# Patient Record
Sex: FEMALE | Race: BLACK OR AFRICAN AMERICAN | NOT HISPANIC OR LATINO | Employment: FULL TIME | URBAN - METROPOLITAN AREA
[De-identification: names, ages, dates, MRNs, and addresses within clinical notes are randomized per-mention and may not be internally consistent; named-entity substitution may affect disease eponyms.]

---

## 2023-10-05 ENCOUNTER — DOCUMENTATION (OUTPATIENT)
Dept: ORTHOPEDIC SURGERY | Facility: CLINIC | Age: 43
End: 2023-10-05
Payer: COMMERCIAL

## 2023-10-05 ENCOUNTER — TELEPHONE (OUTPATIENT)
Dept: ORTHOPEDIC SURGERY | Facility: CLINIC | Age: 43
End: 2023-10-05
Payer: COMMERCIAL

## 2023-10-05 NOTE — TELEPHONE ENCOUNTER
"Pamela Finn sent an e-mail to me, Lillian Singh LPN from [email or web address]    The email is as follows    \"Good Evening    I know I was supposed to submit my paperwork to you and  back in July after my visit but I was able to work a while before the pain started getting bad. My left knee bothers me usually 3-4 days minimum a month. I have attached my FMLA papers for Intermittent leave and my job description again. 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    Thank You  Pamela Finn  [phone]\"        Paper work was forwarded to corresponding providers  for completion      Thank you,     Lillian Singh LPN                "

## 2023-10-10 ENCOUNTER — OFFICE VISIT (OUTPATIENT)
Dept: PRIMARY CARE | Facility: CLINIC | Age: 43
End: 2023-10-10
Payer: COMMERCIAL

## 2023-10-10 VITALS
BODY MASS INDEX: 48.26 KG/M2 | DIASTOLIC BLOOD PRESSURE: 86 MMHG | WEIGHT: 293 LBS | SYSTOLIC BLOOD PRESSURE: 128 MMHG | TEMPERATURE: 97.3 F | HEART RATE: 73 BPM

## 2023-10-10 DIAGNOSIS — R51.9 NONINTRACTABLE HEADACHE, UNSPECIFIED CHRONICITY PATTERN, UNSPECIFIED HEADACHE TYPE: Primary | ICD-10-CM

## 2023-10-10 DIAGNOSIS — E55.9 VITAMIN D DEFICIENCY: ICD-10-CM

## 2023-10-10 DIAGNOSIS — G47.30 SLEEP APNEA, UNSPECIFIED TYPE: ICD-10-CM

## 2023-10-10 DIAGNOSIS — Z00.00 ROUTINE GENERAL MEDICAL EXAMINATION AT A HEALTH CARE FACILITY: ICD-10-CM

## 2023-10-10 DIAGNOSIS — Z78.0 MENOPAUSE: ICD-10-CM

## 2023-10-10 DIAGNOSIS — R10.9 ABDOMINAL PAIN, UNSPECIFIED ABDOMINAL LOCATION: ICD-10-CM

## 2023-10-10 DIAGNOSIS — T78.40XS ALLERGY, SEQUELA: ICD-10-CM

## 2023-10-10 PROCEDURE — 1036F TOBACCO NON-USER: CPT | Performed by: FAMILY MEDICINE

## 2023-10-10 PROCEDURE — 3008F BODY MASS INDEX DOCD: CPT | Performed by: FAMILY MEDICINE

## 2023-10-10 PROCEDURE — 99396 PREV VISIT EST AGE 40-64: CPT | Performed by: FAMILY MEDICINE

## 2023-10-10 RX ORDER — FLUTICASONE PROPIONATE 50 MCG
SPRAY, SUSPENSION (ML) NASAL
COMMUNITY
Start: 2019-04-02 | End: 2023-10-24 | Stop reason: SDUPTHER

## 2023-10-10 RX ORDER — AZELASTINE HYDROCHLORIDE 0.5 MG/ML
SOLUTION/ DROPS OPHTHALMIC
COMMUNITY
Start: 2022-03-23

## 2023-10-10 RX ORDER — TOPIRAMATE 25 MG/1
50 TABLET ORAL 2 TIMES DAILY
Qty: 90 TABLET | Refills: 3 | Status: SHIPPED | OUTPATIENT
Start: 2023-10-10 | End: 2024-02-18

## 2023-10-10 RX ORDER — LIDOCAINE 50 MG/G
PATCH TOPICAL
COMMUNITY
Start: 2023-07-27 | End: 2024-01-24 | Stop reason: ALTCHOICE

## 2023-10-10 RX ORDER — MONTELUKAST SODIUM 10 MG/1
10 TABLET ORAL DAILY
COMMUNITY
End: 2023-10-10 | Stop reason: SDUPTHER

## 2023-10-10 RX ORDER — PANTOPRAZOLE SODIUM 40 MG/1
40 TABLET, DELAYED RELEASE ORAL 2 TIMES DAILY
Qty: 60 TABLET | Refills: 1 | Status: SHIPPED | OUTPATIENT
Start: 2023-10-10 | End: 2023-11-06

## 2023-10-10 RX ORDER — FLUTICASONE PROPIONATE 50 MCG
1 SPRAY, SUSPENSION (ML) NASAL DAILY
Qty: 16 G | Refills: 11 | Status: SHIPPED | OUTPATIENT
Start: 2023-10-10 | End: 2023-11-06

## 2023-10-10 RX ORDER — METHYLPREDNISOLONE 4 MG/1
TABLET ORAL
Qty: 21 TABLET | Refills: 0 | Status: SHIPPED | OUTPATIENT
Start: 2023-10-10 | End: 2023-10-17

## 2023-10-10 RX ORDER — BUPROPION HYDROCHLORIDE 300 MG/1
1 TABLET ORAL DAILY
COMMUNITY
Start: 2019-10-01 | End: 2023-10-10 | Stop reason: SDUPTHER

## 2023-10-10 RX ORDER — BUPROPION HYDROCHLORIDE 300 MG/1
300 TABLET ORAL DAILY
Qty: 90 TABLET | Refills: 3 | Status: SHIPPED | OUTPATIENT
Start: 2023-10-10

## 2023-10-10 RX ORDER — AZELASTINE 1 MG/ML
2 SPRAY, METERED NASAL 2 TIMES DAILY
COMMUNITY
Start: 2019-06-20 | End: 2024-01-24 | Stop reason: ALTCHOICE

## 2023-10-10 RX ORDER — TOPIRAMATE 25 MG/1
1 TABLET ORAL 2 TIMES DAILY
COMMUNITY
Start: 2023-05-12 | End: 2023-10-10 | Stop reason: SDUPTHER

## 2023-10-10 RX ORDER — MONTELUKAST SODIUM 10 MG/1
10 TABLET ORAL DAILY
Qty: 90 TABLET | Refills: 3 | Status: SHIPPED | OUTPATIENT
Start: 2023-10-10

## 2023-10-10 RX ORDER — AZELASTINE 1 MG/ML
1 SPRAY, METERED NASAL 2 TIMES DAILY
Qty: 30 ML | Refills: 12 | Status: SHIPPED | OUTPATIENT
Start: 2023-10-10 | End: 2024-01-24 | Stop reason: WASHOUT

## 2023-10-10 ASSESSMENT — ENCOUNTER SYMPTOMS: ABDOMINAL PAIN: 1

## 2023-10-10 NOTE — PROGRESS NOTES
Subjective   Patient ID: Pamela Finn is a 43 y.o. female who presents for Annual Exam.    HPI     Review of Systems   Gastrointestinal:  Positive for abdominal pain.   All other systems reviewed and are negative.      Objective   /86   Pulse 73   Temp 36.3 °C (97.3 °F)   Wt 136 kg (299 lb)   BMI 48.26 kg/m²     Physical Exam  Cardiovascular:      Rate and Rhythm: Normal rate.   Pulmonary:      Effort: Pulmonary effort is normal.   Abdominal:      Palpations: Abdomen is soft.   Musculoskeletal:      Cervical back: Normal range of motion.   Skin:     General: Skin is warm.   Neurological:      General: No focal deficit present.      Mental Status: She is alert.   Psychiatric:         Mood and Affect: Mood normal.         Assessment/Plan     This is a 43-year-old female who is here for her annual physical    Today's complaints she has a headache frontal headache on exam there were no neurological signs but I feel her headaches are due to allergies sinus pressure treated with Medrol Dosepak Azelastine Nasal spray Flonase    I am very concerned that she has gained 30 pounds over the last 1 year therefore I will refer her to neurology to rule out pseudotumor cerebri    I increase the Topamax to 50 twice a day    She says she has a sugar addiction the reason for the weight gain is that therefore [facility]    Also I referred her to the bariatric center    She is being followed by the breast surgeons for abnormal mammograms    She has lower abdominal pain and nausea for which I gave Protonix also referred her to the gastroenterologist    All the routine lab work was ordered    Advised her to come back in 6 months      She has had a hysterectomy and was asking for hormone replacement therapy I referred her to gynecology  She does not wear her CPAP machine therefore I referred her to the sleep specialist

## 2023-10-10 NOTE — PROGRESS NOTES
Subjective   Patient ID: Pamela Finn is a 43 y.o. female who presents for Annual Exam.    HPI     Review of Systems    Objective   /86   Pulse 73   Temp 36.3 °C (97.3 °F)   Wt 136 kg (299 lb)   BMI 48.26 kg/m²     Physical Exam    Assessment/Plan

## 2023-10-14 ENCOUNTER — LAB (OUTPATIENT)
Dept: LAB | Facility: LAB | Age: 43
End: 2023-10-14
Payer: COMMERCIAL

## 2023-10-14 DIAGNOSIS — E55.9 VITAMIN D DEFICIENCY: ICD-10-CM

## 2023-10-14 DIAGNOSIS — Z00.00 ROUTINE GENERAL MEDICAL EXAMINATION AT A HEALTH CARE FACILITY: ICD-10-CM

## 2023-10-14 LAB
25(OH)D3 SERPL-MCNC: 10 NG/ML (ref 30–100)
ALBUMIN SERPL BCP-MCNC: 3.9 G/DL (ref 3.4–5)
ALP SERPL-CCNC: 83 U/L (ref 33–110)
ALT SERPL W P-5'-P-CCNC: 11 U/L (ref 7–45)
ANION GAP SERPL CALC-SCNC: 13 MMOL/L (ref 10–20)
AST SERPL W P-5'-P-CCNC: 14 U/L (ref 9–39)
BASOPHILS # BLD AUTO: 0.04 X10*3/UL (ref 0–0.1)
BASOPHILS NFR BLD AUTO: 0.8 %
BILIRUB SERPL-MCNC: 0.5 MG/DL (ref 0–1.2)
BUN SERPL-MCNC: 11 MG/DL (ref 6–23)
CALCIUM SERPL-MCNC: 9.6 MG/DL (ref 8.6–10.6)
CHLORIDE SERPL-SCNC: 103 MMOL/L (ref 98–107)
CHOLEST SERPL-MCNC: 177 MG/DL (ref 0–199)
CHOLESTEROL/HDL RATIO: 3.2
CO2 SERPL-SCNC: 27 MMOL/L (ref 21–32)
CREAT SERPL-MCNC: 0.78 MG/DL (ref 0.5–1.05)
EOSINOPHIL # BLD AUTO: 0.09 X10*3/UL (ref 0–0.7)
EOSINOPHIL NFR BLD AUTO: 1.7 %
ERYTHROCYTE [DISTWIDTH] IN BLOOD BY AUTOMATED COUNT: 13.3 % (ref 11.5–14.5)
EST. AVERAGE GLUCOSE BLD GHB EST-MCNC: 108 MG/DL
GFR SERPL CREATININE-BSD FRML MDRD: >90 ML/MIN/1.73M*2
GLUCOSE SERPL-MCNC: 91 MG/DL (ref 74–99)
HBA1C MFR BLD: 5.4 %
HCT VFR BLD AUTO: 42.3 % (ref 36–46)
HDLC SERPL-MCNC: 55.3 MG/DL
HGB BLD-MCNC: 13.3 G/DL (ref 12–16)
IMM GRANULOCYTES # BLD AUTO: 0.01 X10*3/UL (ref 0–0.7)
IMM GRANULOCYTES NFR BLD AUTO: 0.2 % (ref 0–0.9)
LDLC SERPL CALC-MCNC: 112 MG/DL
LYMPHOCYTES # BLD AUTO: 1.94 X10*3/UL (ref 1.2–4.8)
LYMPHOCYTES NFR BLD AUTO: 37.3 %
MCH RBC QN AUTO: 27.8 PG (ref 26–34)
MCHC RBC AUTO-ENTMCNC: 31.4 G/DL (ref 32–36)
MCV RBC AUTO: 88 FL (ref 80–100)
MONOCYTES # BLD AUTO: 0.39 X10*3/UL (ref 0.1–1)
MONOCYTES NFR BLD AUTO: 7.5 %
NEUTROPHILS # BLD AUTO: 2.73 X10*3/UL (ref 1.2–7.7)
NEUTROPHILS NFR BLD AUTO: 52.5 %
NON HDL CHOLESTEROL: 122 MG/DL (ref 0–149)
NRBC BLD-RTO: 0 /100 WBCS (ref 0–0)
PLATELET # BLD AUTO: 423 X10*3/UL (ref 150–450)
PMV BLD AUTO: 10 FL (ref 7.5–11.5)
POTASSIUM SERPL-SCNC: 4.5 MMOL/L (ref 3.5–5.3)
PROT SERPL-MCNC: 7.3 G/DL (ref 6.4–8.2)
RBC # BLD AUTO: 4.79 X10*6/UL (ref 4–5.2)
SODIUM SERPL-SCNC: 138 MMOL/L (ref 136–145)
TRIGL SERPL-MCNC: 51 MG/DL (ref 0–149)
TSH SERPL-ACNC: 1.32 MIU/L (ref 0.44–3.98)
VLDL: 10 MG/DL (ref 0–40)
WBC # BLD AUTO: 5.2 X10*3/UL (ref 4.4–11.3)

## 2023-10-14 PROCEDURE — 85025 COMPLETE CBC W/AUTO DIFF WBC: CPT

## 2023-10-14 PROCEDURE — 84443 ASSAY THYROID STIM HORMONE: CPT

## 2023-10-14 PROCEDURE — 82306 VITAMIN D 25 HYDROXY: CPT

## 2023-10-14 PROCEDURE — 80053 COMPREHEN METABOLIC PANEL: CPT

## 2023-10-14 PROCEDURE — 83036 HEMOGLOBIN GLYCOSYLATED A1C: CPT

## 2023-10-14 PROCEDURE — 36415 COLL VENOUS BLD VENIPUNCTURE: CPT

## 2023-10-14 PROCEDURE — 80061 LIPID PANEL: CPT

## 2023-10-24 ENCOUNTER — DOCUMENTATION (OUTPATIENT)
Dept: GASTROENTEROLOGY | Facility: HOSPITAL | Age: 43
End: 2023-10-24
Payer: COMMERCIAL

## 2023-10-24 PROBLEM — R11.2 NAUSEA AND VOMITING: Status: ACTIVE | Noted: 2023-10-24

## 2023-10-24 PROBLEM — L98.9 SKIN LESION: Status: ACTIVE | Noted: 2023-10-24

## 2023-10-24 PROBLEM — H00.11 CHALAZION OF RIGHT UPPER EYELID: Status: ACTIVE | Noted: 2023-10-24

## 2023-10-24 PROBLEM — R29.818 SUSPECTED SLEEP APNEA: Status: ACTIVE | Noted: 2023-10-24

## 2023-10-24 PROBLEM — M17.12 ARTHRITIS OF KNEE, LEFT: Status: ACTIVE | Noted: 2023-10-24

## 2023-10-24 PROBLEM — E88.810 DYSMETABOLIC SYNDROME: Status: ACTIVE | Noted: 2023-10-24

## 2023-10-24 PROBLEM — I10 BENIGN ESSENTIAL HYPERTENSION: Status: ACTIVE | Noted: 2023-10-24

## 2023-10-24 PROBLEM — N63.0 LUMP, BREAST: Status: ACTIVE | Noted: 2023-10-24

## 2023-10-24 PROBLEM — J20.9 BRONCHITIS WITH BRONCHOSPASM: Status: ACTIVE | Noted: 2023-10-24

## 2023-10-24 PROBLEM — S83.282A TEAR OF LATERAL MENISCUS OF LEFT KNEE: Status: ACTIVE | Noted: 2023-10-24

## 2023-10-24 PROBLEM — R35.0 INCREASED FREQUENCY OF URINATION: Status: ACTIVE | Noted: 2023-10-24

## 2023-10-24 PROBLEM — J30.9 ALLERGIC RHINITIS DUE TO ALLERGEN: Status: ACTIVE | Noted: 2023-10-24

## 2023-10-24 PROBLEM — Z98.84 BARIATRIC SURGERY STATUS: Status: ACTIVE | Noted: 2023-10-24

## 2023-10-24 PROBLEM — R01.1 MURMUR, CARDIAC: Status: ACTIVE | Noted: 2023-10-24

## 2023-10-24 PROBLEM — J02.9 SORE THROAT: Status: ACTIVE | Noted: 2023-10-24

## 2023-10-24 PROBLEM — M25.561 KNEE PAIN, BILATERAL: Status: ACTIVE | Noted: 2023-10-24

## 2023-10-24 PROBLEM — L70.9 ACNE: Status: ACTIVE | Noted: 2023-10-24

## 2023-10-24 PROBLEM — R35.0 URINARY FREQUENCY: Status: ACTIVE | Noted: 2023-10-24

## 2023-10-24 PROBLEM — M25.569 KNEE PAIN: Status: ACTIVE | Noted: 2023-10-24

## 2023-10-24 PROBLEM — R51.9 HEAD ACHE: Status: ACTIVE | Noted: 2023-10-24

## 2023-10-24 PROBLEM — K21.9 GERD (GASTROESOPHAGEAL REFLUX DISEASE): Status: ACTIVE | Noted: 2023-10-24

## 2023-10-24 PROBLEM — R63.2 POLYPHAGIA: Status: ACTIVE | Noted: 2023-10-24

## 2023-10-24 PROBLEM — G47.00 INSOMNIA: Status: ACTIVE | Noted: 2023-10-24

## 2023-10-24 PROBLEM — B35.1 ONYCHOMYCOSIS OF TOENAIL: Status: ACTIVE | Noted: 2023-10-24

## 2023-10-24 PROBLEM — H52.4 PRESBYOPIA: Status: ACTIVE | Noted: 2023-10-24

## 2023-10-24 PROBLEM — H00.19 CHALAZION: Status: ACTIVE | Noted: 2023-10-24

## 2023-10-24 PROBLEM — H00.019 STYE EXTERNAL: Status: ACTIVE | Noted: 2023-10-24

## 2023-10-24 PROBLEM — N92.6 IRREGULAR MENSES: Status: ACTIVE | Noted: 2023-10-24

## 2023-10-24 PROBLEM — J30.9 ALLERGIC RHINITIS: Status: ACTIVE | Noted: 2023-10-24

## 2023-10-24 PROBLEM — N63.10 BREAST MASS, RIGHT: Status: ACTIVE | Noted: 2023-10-24

## 2023-10-24 PROBLEM — R51.9 CHRONIC HEADACHES: Status: ACTIVE | Noted: 2023-10-24

## 2023-10-24 PROBLEM — M25.562 KNEE PAIN, BILATERAL: Status: ACTIVE | Noted: 2023-10-24

## 2023-10-24 PROBLEM — G47.33 OBSTRUCTIVE SLEEP APNEA SYNDROME: Status: ACTIVE | Noted: 2023-10-24

## 2023-10-24 PROBLEM — B34.9 VIRAL INFECTION: Status: ACTIVE | Noted: 2023-10-24

## 2023-10-24 PROBLEM — N93.9 ABNORMAL VAGINAL BLEEDING: Status: ACTIVE | Noted: 2023-10-24

## 2023-10-24 PROBLEM — H52.209 ASTIGMATISM: Status: ACTIVE | Noted: 2023-10-24

## 2023-10-24 PROBLEM — J30.2 SEASONAL ALLERGIC RHINITIS: Status: ACTIVE | Noted: 2023-10-24

## 2023-10-24 PROBLEM — N63.20 BREAST MASS, LEFT: Status: ACTIVE | Noted: 2023-10-24

## 2023-10-24 PROBLEM — N94.6 SEVERE MENSTRUAL CRAMPS: Status: ACTIVE | Noted: 2023-10-24

## 2023-10-24 PROBLEM — M17.0 LOCALIZED OSTEOARTHRITIS OF KNEES, BILATERAL: Status: ACTIVE | Noted: 2023-10-24

## 2023-10-24 PROBLEM — R92.8 ABNORMAL SCREENING MAMMOGRAM: Status: ACTIVE | Noted: 2023-10-24

## 2023-10-24 PROBLEM — H52.00 HYPEROPIA: Status: ACTIVE | Noted: 2023-10-24

## 2023-10-24 PROBLEM — R68.81 EARLY SATIETY: Status: ACTIVE | Noted: 2023-10-24

## 2023-10-24 PROBLEM — E66.01 MORBID OBESITY WITH BODY MASS INDEX (BMI) OF 40.0 TO 44.9 IN ADULT (MULTI): Status: ACTIVE | Noted: 2023-10-24

## 2023-10-24 PROBLEM — G89.29 CHRONIC HEADACHES: Status: ACTIVE | Noted: 2023-10-24

## 2023-10-24 PROBLEM — M94.20 CHONDROMALACIA: Status: ACTIVE | Noted: 2023-10-24

## 2023-10-24 PROBLEM — R06.02 SHORTNESS OF BREATH: Status: ACTIVE | Noted: 2023-10-24

## 2023-10-24 PROBLEM — E66.01 MORBID (SEVERE) OBESITY DUE TO EXCESS CALORIES (MULTI): Status: ACTIVE | Noted: 2023-10-24

## 2023-10-24 RX ORDER — PHENTERMINE HYDROCHLORIDE 37.5 MG/1
TABLET ORAL
COMMUNITY
End: 2024-01-24 | Stop reason: ALTCHOICE

## 2023-10-24 RX ORDER — MOMETASONE FUROATE 1 MG/G
CREAM TOPICAL
COMMUNITY
End: 2024-01-24 | Stop reason: ALTCHOICE

## 2023-10-24 RX ORDER — RIZATRIPTAN BENZOATE 10 MG/1
TABLET ORAL
COMMUNITY

## 2023-10-24 RX ORDER — MELOXICAM 15 MG/1
TABLET ORAL
COMMUNITY
End: 2024-01-24 | Stop reason: ALTCHOICE

## 2023-10-26 ENCOUNTER — OFFICE VISIT (OUTPATIENT)
Dept: GASTROENTEROLOGY | Facility: HOSPITAL | Age: 43
End: 2023-10-26
Payer: COMMERCIAL

## 2023-10-26 ENCOUNTER — APPOINTMENT (OUTPATIENT)
Dept: ORTHOPEDIC SURGERY | Facility: CLINIC | Age: 43
End: 2023-10-26
Payer: COMMERCIAL

## 2023-10-26 VITALS
HEART RATE: 77 BPM | DIASTOLIC BLOOD PRESSURE: 73 MMHG | SYSTOLIC BLOOD PRESSURE: 118 MMHG | BODY MASS INDEX: 46.65 KG/M2 | OXYGEN SATURATION: 97 % | WEIGHT: 289 LBS | TEMPERATURE: 97 F

## 2023-10-26 DIAGNOSIS — K21.9 GASTROESOPHAGEAL REFLUX DISEASE WITHOUT ESOPHAGITIS: Primary | ICD-10-CM

## 2023-10-26 DIAGNOSIS — R10.9 ABDOMINAL PAIN, UNSPECIFIED ABDOMINAL LOCATION: ICD-10-CM

## 2023-10-26 PROCEDURE — 99215 OFFICE O/P EST HI 40 MIN: CPT | Performed by: INTERNAL MEDICINE

## 2023-10-26 PROCEDURE — 3008F BODY MASS INDEX DOCD: CPT | Performed by: INTERNAL MEDICINE

## 2023-10-26 PROCEDURE — 1036F TOBACCO NON-USER: CPT | Performed by: INTERNAL MEDICINE

## 2023-10-26 PROCEDURE — 3074F SYST BP LT 130 MM HG: CPT | Performed by: INTERNAL MEDICINE

## 2023-10-26 PROCEDURE — 99205 OFFICE O/P NEW HI 60 MIN: CPT | Performed by: INTERNAL MEDICINE

## 2023-10-26 PROCEDURE — 3078F DIAST BP <80 MM HG: CPT | Performed by: INTERNAL MEDICINE

## 2023-10-26 SDOH — ECONOMIC STABILITY: FOOD INSECURITY: WITHIN THE PAST 12 MONTHS, YOU WORRIED THAT YOUR FOOD WOULD RUN OUT BEFORE YOU GOT MONEY TO BUY MORE.: NEVER TRUE

## 2023-10-26 SDOH — ECONOMIC STABILITY: FOOD INSECURITY: WITHIN THE PAST 12 MONTHS, THE FOOD YOU BOUGHT JUST DIDN'T LAST AND YOU DIDN'T HAVE MONEY TO GET MORE.: NEVER TRUE

## 2023-10-26 ASSESSMENT — PAIN SCALES - GENERAL: PAINLEVEL: 0-NO PAIN

## 2023-10-26 ASSESSMENT — PATIENT HEALTH QUESTIONNAIRE - PHQ9
1. LITTLE INTEREST OR PLEASURE IN DOING THINGS: NOT AT ALL
2. FEELING DOWN, DEPRESSED OR HOPELESS: NOT AT ALL
SUM OF ALL RESPONSES TO PHQ9 QUESTIONS 1 & 2: 0

## 2023-10-26 ASSESSMENT — LIFESTYLE VARIABLES: HOW OFTEN DO YOU HAVE A DRINK CONTAINING ALCOHOL: NEVER

## 2023-10-26 NOTE — PROGRESS NOTES
Gastroenterology Office Visit Note    Reason For Visit:  Heart burn    History Of Present Illness  Pamela Finn is a 43 y.o. female with a past medical history of migraine and allergies came in  for evaluation of heartburn. This has been associated with abdominal bloating, belching, fullness after meals, and heartburn. She denies choking on food, difficulty swallowing, hoarseness, and laryngitis. Symptoms have been present for 1 months. She denies dysphagia. She has not lost weight. She denies melena, hematochezia, hematemesis, and coffee ground emesis. Medical therapy in the past has included: proton pump inhibitors, recently started nexium 40 mg twice daily about a week ago. Patient takes meloxicam but hasn't had taken in months.     Patient has FH of stomach cancer in his dad but didn't report any FH of colorectal, pancreatic or esophageal cancer. Also, no FH of inflammatory bowel disease.    Prev endoscopic eval:   EGD 2021  Impression:            - Normal esophagus.                         - Z-line regular, 38 cm from the incisors.                         - Normal stomach. Biopsied.                         - Normal examined duodenum.     Past Medical History  As menitoned in the HPI.    Surgical History  Cholecystectomy     Social History  She reports that she has never smoked. She has never used smokeless tobacco. She reports that she does not currently use alcohol. She reports that she does not use drugs.    Family History  Family History   Problem Relation Name Age of Onset    Hypertension Mother      Alcohol abuse Father      Diabetes Father      Hypertension Father      Glaucoma Maternal Grandfather      Breast cancer Other Grandparent         Allergies  Amoxicillin, Oxycodone, Oxycodone-acetaminophen, Penicillin g, and Penicillin    Home Medications  Current Outpatient Medications   Medication Instructions    azelastine (Astelin) 137 mcg (0.1 %) nasal spray 2 sprays, nasal, 2 times daily    azelastine  (Astelin) 137 mcg (0.1 %) nasal spray 1 spray, Each Nostril, 2 times daily, Use in each nostril as directed    azelastine (Optivar) 0.05 % ophthalmic solution ophthalmic (eye)    buPROPion XL (WELLBUTRIN XL) 300 mg, oral, Daily    fluticasone (Flonase) 50 mcg/actuation nasal spray 1 spray, Each Nostril, Daily, Shake gently. Before first use, prime pump. After use, clean tip and replace cap.    lidocaine (Lidoderm) 5 % patch APPLY 1 PATCH TO THE AFFECTED AREA AND LEAVE IN PLACE FOR 12 HOURS, THEN REMOVE AND LEAVE OFF FOR 12 HOURS.    meloxicam (Mobic) 15 mg tablet TAKE 1 TABLET DAILY WITH FOOD.<BR>    mometasone (Elocon) 0.1 % cream No dose, route, or frequency recorded.    montelukast (SINGULAIR) 10 mg, oral, Daily    pantoprazole (PROTONIX) 40 mg, oral, 2 times daily, Do not crush, chew, or split.    phentermine (Adipex-P) 37.5 mg tablet Take 1 tablet daily    rizatriptan (Maxalt) 10 mg tablet 1po under the tongue on onset of severe headache may repeat it again after 8 hours up to 2 a day    topiramate (TOPAMAX) 50 mg, oral, 2 times daily        Review of Systems  His/Her 12 point ROS is negative except as above.      Physical Exam  Objective   Visit Vitals  /73   Pulse 77   Temp 36.1 °C (97 °F)          General: not in acute distress  HEENT: atraumatic, normocephalic  CV: regular rate and rhythm, no murmur  Resp: Normal breathing pattern  GI: soft, non-tender to palpation, no rebound or guarding, no ascites  Msk: no lower extremity edema  Derm: no jaundice  Psych: normal affect   Neuro: AAOX3, moving all limbs       Relevant Results  Lab Results   Component Value Date    WBC 5.2 10/14/2023    HGB 13.3 10/14/2023    HCT 42.3 10/14/2023    MCV 88 10/14/2023     10/14/2023     Lab Results   Component Value Date    GLUCOSE 91 10/14/2023    CALCIUM 9.6 10/14/2023     10/14/2023    K 4.5 10/14/2023    CO2 27 10/14/2023     10/14/2023    BUN 11 10/14/2023    CREATININE 0.78 10/14/2023     Lab  Results   Component Value Date    ALT 11 10/14/2023    AST 14 10/14/2023    ALKPHOS 83 10/14/2023    BILITOT 0.5 10/14/2023            ASSESSMENT/PLAN:  #Heartburn  #Epigastric pain  #Nausea    Patient reported t typical symptoms suggestive of GERD however she does not have any alarming features including weight loss, melena, anemia or dysphagia.  She has had normal endoscopy in 2021.  She is not currently taking any PPI.  Her symptoms of nausea could be related to esophagitis or gastritis.    Plan:  - We will give her a trial of PPI twice daily for 4 to 6 weeks and if her symptoms does not improve, we will repeat endoscopy and further work-up including pH impedance study.  - Education provided for GERD including weight loss, elevation of head side of her bed, avoiding spicy and greasy foods.  - Follow-up in 3 months.    I spent more than 35 minutes in the professional and overall care of this patient.    Madonna Cuellar MD

## 2023-10-26 NOTE — PATIENT INSTRUCTIONS
Thank you for visiting us today, As we discussed today,     1: Recommend taking pantoprazole 40 mg twice daily, best taken 30 minutes before breakfast and dinner.   2: If you don't feel any improvement in 6-8 weeks, we will consider doing additional work up including endoscopy. Please notify us in case if your symptoms don't improve with pantoprazole.  3: Follow up in 3 months.    Please call us for any questions on [phone], and if you need to schedule an appointment please call us at [phone].

## 2023-10-29 NOTE — PROGRESS NOTES
I saw and evaluated the patient. I personally obtained the key and critical portions of the history and physical exam or was physically present for key and critical portions performed by the trainee. I agree with the trainee's medical decision making as described in the trainee's note.    Aron Duran MD

## 2023-11-03 PROBLEM — E66.01 MORBID OBESITY WITH BODY MASS INDEX (BMI) OF 40.0 TO 44.9 IN ADULT (MULTI): Status: RESOLVED | Noted: 2023-10-24 | Resolved: 2023-11-03

## 2023-11-03 NOTE — PROGRESS NOTES
"       [facility]  [address]  [facility]  [address]  [place] [address]     [facility]  New Visit Note      Subjective   Patient ID: Pamela Finn is a 43 y.o. female with past medical history significant for morbid obesity, HTN, heart murmur, allergic rhinitis, bariatric status, and sleep disorder breathing    Patient is here {pxarrival:99358} and referred by Nikki Mclaughlin MD  for comprehensive sleep medicine evaluation due to {SleepCC:38535}.    HPI  {OSAhx:76325}      SLEEP STUDY HISTORY: (personally reviewed raw data such as interpretation report, data sheet, hypnogram, and titration table if available and applicable)  ***    [unfilled]    SLEEP-WAKE SCHEDULE  Bedtime: *** on weekdays, *** on weekends  Subjective sleep latency: ***  Problems falling asleep: ***  Number of awakenings: *** times per night spontaneously for unknown reasons  Falls back asleep in ***  Problems staying asleep: ***  Final wake time: *** on weekdays, *** on weekends  Out of bed time: *** on weekdays, *** on weekends  Shift work: ***  Naps: ***  Feels rested after a nap: {Yes/No:93296}  Average sleep duration (excluding naps): ***    SLEEP ENVIRONMENT  Sleep location: ***  Sleep status: {sleep status:44072}  TV in bedroom: {Yes/No:68718}  Room is dark:  {Yes/No:00080::\"Yes\"}  Room is quiet: {Yes/No:72706::\"Yes\"}  Room is cool: {Yes/No:13822::\"Yes\"}  Bed comfort: good    SLEEP HABITS:   Activities before bedtime: ***  Activities in bed: ***  Preferred sleep position: {Sleep position:34659}  Clockwatching: {Yes/No:56700}  Uses alarm to wake up: {Yes/No:70588}    SLEEP ROS:  Night symptoms: {sleep apnea ROS at night:06736}  Morning symptoms: {sleep apnea ROS in mornin}  Daytime symptoms {sleep apnea ROS at daytime:46771}  Hypersomnia / narcolepsy symptoms: {narcolepsy ROS:79346}  RLS symptoms: {RLS " symptoms:75409}  Movements in sleep: {sleep movements:42902}  Parasomnia symptoms: {parasomnia ROS:59161}    WEIGHT: {weight:30536}    REVIEW OF SYSTEMS: All other systems have been reviewed and are negative.    PERTINENT SOCIAL HISTORY:  Occupation: employment status:58536}  Smoking: {Yes/No:43893}  ETOH: {Yes/No:17882}  Marijuana: {Yes/No:10582}  Caffeine: ***  Sleep aids: {Yes/No:73933}  Claustrophobia: {Yes/No:12790}    PERTINENT PAST SURGICAL HISTORY:  {surgical history pertinent in sleep:09082}    PERTINENT FAMILY HISTORY:  {family history in sleep:65143}    Active Problems, Allergy List, Medication List, and PMH/PSH/FH/Social Hx have been reviewed and reconciled in chart. No significant changes unless documented in the pertinent chart section. Updates made when necessary.       Objective   There were no vitals taken for this visit.    No data recorded  [unfilled]    Physical Exam  Constitutional:alert and oriented to time, place, and person  Sinus: *** tenderness to palpation  Palate: Normal / Narrow / High-arched / *** torus palatini  Mallampati ***, Tonsils: ***  *** Tongue scalloping, *** macroglossia  Lungs: Clear to auscultation bilateral, no rales  Heart: Regular rate and rhythm, no murmurs    Assessment/Plan   Pamela Finn is a 43 y.o. female who is seen to evaluate for ***possible obstructive sleep apnea. The pathophysiology of sleep apnea, diagnostic testing (HST vs PSG), treatment options (PAP, oral appliance, surgery, hypoglossal nerve stimulator called Inspire), and supportive management (weight loss, positional therapy, smoking cessation, avoidance of alcohol and sedatives) were discussed with the patient in detail. Risk factors of sleep apnea as well as cardiometabolic and neurocognitive sequelae associated with untreated sleep apnea were also discussed. Lastly, patient was advised to avoid driving vehicle or operating heavy machinery when sleepy.     Pamela Finn with the following problems:      SLEEP DISORDERED BREATHING:  -This is likely sleep apnea based on the the history and physical examination. STOP-BANG:  Pamela Álvarez not yet had a sleep study.  -Instruct patient to complete home/ in lab/ split night/ titration sleep study.  -HSAT is reasonable as patient likely has LUIS ANTONIO based on history and exam and does not have any of the following comorbidities: CHF, neuromuscular weakness, hypoventilation, or significant COPD.  -We consider treatment as indicated when testing is complete.        CHRONIC SLEEP ONSET/ SLEEP MAINTENANCE INSOMNIA:  -likely due to poor sleep hygiene, irregular sleep schedule, depression, anxiety, untreated sleep apnea, nocturia, RLS, delayed sleep phase syndrome, and chronic pain. [Meds] may be a contributing factor as well.  -RENEE:      HYPERTENSION/HEART MURMUR:  -Blood pressure was controlled today.  -Denies headache, palpitation, and syncope in the clinic.  -Last Echo:  -Follows with PCP/ Cardiology     MORBID OBESITY:  -with a BMI of []. Pamela Finn most recent Bicarbonate was 27 in Oct,2023.    NASAL CONGESTION:  -Instruct Pamela Teixeira use appropriate Flonase spray to ease congestion.    RTC 2-3 weeks after sleep study    {Assess/PlanSmartLinks:44969}    All of patient's questions were answered. She verbalizes understanding and agreement with my assessment and plan.

## 2023-11-04 DIAGNOSIS — R10.9 ABDOMINAL PAIN, UNSPECIFIED ABDOMINAL LOCATION: ICD-10-CM

## 2023-11-04 DIAGNOSIS — R51.9 NONINTRACTABLE HEADACHE, UNSPECIFIED CHRONICITY PATTERN, UNSPECIFIED HEADACHE TYPE: ICD-10-CM

## 2023-11-06 RX ORDER — PANTOPRAZOLE SODIUM 40 MG/1
40 TABLET, DELAYED RELEASE ORAL
Qty: 90 TABLET | Refills: 3 | Status: SHIPPED | OUTPATIENT
Start: 2023-11-06 | End: 2024-03-25

## 2023-11-06 RX ORDER — FLUTICASONE PROPIONATE 50 MCG
1 SPRAY, SUSPENSION (ML) NASAL DAILY
Qty: 48 ML | Refills: 4 | Status: SHIPPED | OUTPATIENT
Start: 2023-11-06 | End: 2024-11-05

## 2023-11-07 ENCOUNTER — DOCUMENTATION (OUTPATIENT)
Dept: SURGERY | Facility: CLINIC | Age: 43
End: 2023-11-07
Payer: COMMERCIAL

## 2023-11-07 ENCOUNTER — OFFICE VISIT (OUTPATIENT)
Dept: ORTHOPEDIC SURGERY | Facility: HOSPITAL | Age: 43
End: 2023-11-07
Payer: COMMERCIAL

## 2023-11-07 ENCOUNTER — APPOINTMENT (OUTPATIENT)
Dept: SLEEP MEDICINE | Facility: CLINIC | Age: 43
End: 2023-11-07
Payer: COMMERCIAL

## 2023-11-07 DIAGNOSIS — M17.0 LOCALIZED OSTEOARTHRITIS OF KNEES, BILATERAL: Primary | ICD-10-CM

## 2023-11-07 PROCEDURE — 3074F SYST BP LT 130 MM HG: CPT | Performed by: PHYSICIAN ASSISTANT

## 2023-11-07 PROCEDURE — 2500000005 HC RX 250 GENERAL PHARMACY W/O HCPCS: Performed by: PHYSICIAN ASSISTANT

## 2023-11-07 PROCEDURE — 99214 OFFICE O/P EST MOD 30 MIN: CPT | Performed by: PHYSICIAN ASSISTANT

## 2023-11-07 PROCEDURE — 99214 OFFICE O/P EST MOD 30 MIN: CPT | Mod: 25 | Performed by: PHYSICIAN ASSISTANT

## 2023-11-07 PROCEDURE — 2500000004 HC RX 250 GENERAL PHARMACY W/ HCPCS (ALT 636 FOR OP/ED): Performed by: PHYSICIAN ASSISTANT

## 2023-11-07 PROCEDURE — 3078F DIAST BP <80 MM HG: CPT | Performed by: PHYSICIAN ASSISTANT

## 2023-11-07 PROCEDURE — 20610 DRAIN/INJ JOINT/BURSA W/O US: CPT | Performed by: PHYSICIAN ASSISTANT

## 2023-11-07 PROCEDURE — 1036F TOBACCO NON-USER: CPT | Performed by: PHYSICIAN ASSISTANT

## 2023-11-07 PROCEDURE — 3008F BODY MASS INDEX DOCD: CPT | Performed by: PHYSICIAN ASSISTANT

## 2023-11-07 RX ORDER — TRIAMCINOLONE ACETONIDE 40 MG/ML
40 INJECTION, SUSPENSION INTRA-ARTICULAR; INTRAMUSCULAR
Status: COMPLETED | OUTPATIENT
Start: 2023-11-07 | End: 2023-11-07

## 2023-11-07 RX ORDER — LIDOCAINE HYDROCHLORIDE 10 MG/ML
5 INJECTION INFILTRATION; PERINEURAL
Status: COMPLETED | OUTPATIENT
Start: 2023-11-07 | End: 2023-11-07

## 2023-11-07 RX ADMIN — LIDOCAINE HYDROCHLORIDE 5 ML: 10 INJECTION, SOLUTION INFILTRATION; PERINEURAL at 15:02

## 2023-11-07 RX ADMIN — TRIAMCINOLONE ACETONIDE 40 MG: 40 INJECTION, SUSPENSION INTRA-ARTICULAR; INTRAMUSCULAR at 15:02

## 2023-11-07 NOTE — PROGRESS NOTES
HPI  Patient is a 43-year-old female with a history of localized osteoarthritis of the knees, bilaterally. She presents today for follow-up for left knee osteoarthritis and for repeat corticosteroid injection in her left knee only. Patient states that previous injections have provided her with good relief of symptoms for the full 3 months, she reports her left knee pain just returned last week. Conservative treatment options for osteoarthritis have been discussed such as physical therapy, weight loss, and NSAID's. Risks, including infection and cartilage damage, as well as benefits were explained regarding corticosteroid injections and the patient agreed to proceed with the injection today.     ROS: All other systems have been reviewed and are negative except as previously noted in history of present illness     Physical Exam  Gen: The patient is alert and oriented Ã--3, is in no acute distress, and appear their stated age and weight.  Psychiatric: Mood and affect are appropriate.  Eyes: Sclera are white, and pupils are round and symmetric.  ENT: Mucous membranes are moist.   Neck: Supple. Thyroid is midline.  Respiratory: Respirations are nonlabored, chest rise is symmetric.  Cardiac: Rate is regular by palpation of distal pulses.   Abdomen: Nondistended.  Integument: No obvious cutaneous lesions are noted. No signs of lymphangitis. No signs of systemic edema.    Left Knee Exam     Muscle Strength   The patient has normal left knee strength.    Tenderness   Left knee tenderness location: Lateral and patellofemoral compartments.    Range of Motion   The patient has normal left knee ROM.  Extension:  0   Flexion:  120     Tests   Left knee patellar apprehension test: positive patellar crepitus.    Other   Erythema: absent  Scars: absent  Sensation: normal  Pulse: present  Swelling: none  Effusion: no effusion present           XR/MR: Previous images of left knee reviewed personally by me today and reveal mild to  moderate arthritis with joint space narrowing noted in lateral compartment    Assessment  Patient is a 43-year-old female with a history of localized osteoarthritis of the knees, bilaterally. Previous x-rays of her knees showed mild degenerative changes. She is having continued pain with her left knee and has opted for a corticosteroid injection for symptom relief at this time.     Patient ID: Pamela Finn is a 43 y.o. female.    L Inj/Asp: L knee on 11/7/2023 3:02 PM  Indications: pain  Details: 21 G needle, anterolateral approach  Medications: 5 mL lidocaine 10 mg/mL (1 %); 40 mg triamcinolone acetonide 40 mg/mL  Outcome: tolerated well, no immediate complications  Procedure, treatment alternatives, risks and benefits explained, specific risks discussed. Consent was given by the patient. Immediately prior to procedure a time out was called to verify the correct patient, procedure, equipment, support staff and site/side marked as required. Patient was prepped and draped in the usual sterile fashion.         Plan  - Rest for 24-28 hours on the left knee and then gradually return to activities as normal  - Follow-up in 3 months or as needed  - No x-rays next visit  -All questions were answered today

## 2023-11-07 NOTE — PROGRESS NOTES
Pt called and left a vcml in the NAOMI box to be called for scheduling. I called the pt and scheduled and her np with her appt confirmation was emailed to verified email address.

## 2023-11-24 ENCOUNTER — APPOINTMENT (OUTPATIENT)
Dept: OBSTETRICS AND GYNECOLOGY | Facility: CLINIC | Age: 43
End: 2023-11-24
Payer: COMMERCIAL

## 2023-11-24 ENCOUNTER — APPOINTMENT (OUTPATIENT)
Dept: NUTRITION | Facility: CLINIC | Age: 43
End: 2023-11-24
Payer: COMMERCIAL

## 2024-01-02 ENCOUNTER — ANCILLARY PROCEDURE (OUTPATIENT)
Dept: RADIOLOGY | Facility: CLINIC | Age: 44
End: 2024-01-02
Payer: COMMERCIAL

## 2024-01-02 DIAGNOSIS — R92.8 OTHER ABNORMAL AND INCONCLUSIVE FINDINGS ON DIAGNOSTIC IMAGING OF BREAST: ICD-10-CM

## 2024-01-02 PROCEDURE — 77066 DX MAMMO INCL CAD BI: CPT | Performed by: STUDENT IN AN ORGANIZED HEALTH CARE EDUCATION/TRAINING PROGRAM

## 2024-01-02 PROCEDURE — 77062 BREAST TOMOSYNTHESIS BI: CPT

## 2024-01-02 PROCEDURE — 77062 BREAST TOMOSYNTHESIS BI: CPT | Performed by: STUDENT IN AN ORGANIZED HEALTH CARE EDUCATION/TRAINING PROGRAM

## 2024-01-22 NOTE — PROGRESS NOTES
Subjective   Date: 1/22/2024 Time: 9:06 AM  Name: Pamela Finn  MRN: [id number]  This is a 43 y.o. female with morbid obesity (Body mass index is 48.42 kg/m².) who presents to clinic for consideration of bariatric surgery. she has attempted and failed multiple diet and exercise regimens for weight loss. Initial Onset of obesity was in childhood.  Their goal for surgery is to  be healthier . The patient has tried multiple diets to lose weight including  meal replacement shakes . The patient was most successful with the  meal replacement shakes . The most pounds lost on this diet were 75 lbs. The patient considers their dietary weakness to be  irregular eating patterns, doesn't eat until late in the day, then eats high fat foods like ice cream, also drinks coke to help with migraines.  The patient reports a  highest weight ever of 299 pounds and lowest weight ever of 130 pounds Distribution of Obesity: is general. Current diet:  none . Compliance: N/A Problems:  irregular eating patterns  } Dietary Details Include:Dietary Details: 1-2 servings fruits and vegetables, 1 serving protein.  The patient does not exercise     Types of Exercise : does not exercise    Comorbidities: sleep apnea not using an appliance , arthritis left knee    Tried different things to lose weight and only successful for a little while.  She has researched surgery for a while.     Unsure which surgery she wants  Notes sweets to be her down fall.  No heartburn  She is on pantoprazole-she would get nausea after eating with some foods. Could not determine what food and then started PPI and nausea has gone away      On PPI    How bad is the heartburn? 0 = No symptoms  Heartburn when lying down? 0 = No symptoms  Heartburn when standing up? 0 = No symptoms  Heartburn after meals? 0 = No symptoms  Does heartburn change your diet? 0 = No symptoms  Does heartburn wake you from sleep? 0 = No symptoms  Do you have difficulty swallowing? 0 = No  symptoms  Do you have pain with swallowing? 0 = No symptoms  If you take medication, does this affect your daily life? 0 = No symptoms  How bad is the regurgitation? 0 = No symptoms  Regurgitation when lying down? 0 = No symptoms  Regurgitation when standing up? 0 = No symptoms  Regurgitation after meals? 0 = No symptoms  Does regurgitation change your diet? 0 = No symptoms  Does regurgitation wake you from sleep? 0 = No symptoms  How satisfied are you with your present condition? Satisfied    PMH:   Past Medical History:   Diagnosis Date    Allergies     seasonal    Arthritis     left knee    Migraines     Sleep apnea     has CPAP    Stomach disorder     nausea after eating, started 2023    Does not use her cpap/ not sure if has trouble sleeping but goes to bathroom a lot    PSH:   Past Surgical History:   Procedure Laterality Date    BREAST BIOPSY Right     benign (fibroadenoma)    CHOLECYSTECTOMY  2004    laparascopic    KNEE Left 2020    meniscus repair    OTHER SURGICAL HISTORY  04/02/2019    Laparascopic Hysterectomy    OTHER SURGICAL HISTORY Right 2009    sweat gland removal          FAMILY HISTORY:  Family History   Problem Relation Name Age of Onset    Hypertension Mother      Alcohol abuse Father      Diabetes Father      Hypertension Father      Glaucoma Maternal Grandfather      Breast cancer Other Grandparent         SOCIAL HISTORY:  Social History     Tobacco Use    Smoking status: Never    Smokeless tobacco: Never   Substance Use Topics    Alcohol use: Not Currently     Comment: no alcohol in 3 yrs.    Drug use: Never       MEDICATIONS:  Prior to Admission Medications:  Medication Documentation Review Audit       Reviewed by Hien Santoro MD MPH (Physician) on 01/24/24 at 1300      Medication Order Taking? Sig Documenting Provider Last Dose Status   Discontinued 01/24/24 1240   azelastine (Astelin) 137 mcg (0.1 %) nasal spray [id number] No Administer 1 spray into each nostril 2 times a day. Use in  each nostril as directed   Patient not taking: Reported on 10/26/2023    Nikki Mclaughlin MD Not Taking Active   azelastine (Optivar) 0.05 % ophthalmic solution [id number] No Administer into affected eye(s). Historical Provider, MD Not Taking Active   buPROPion XL (Wellbutrin XL) 300 mg 24 hr tablet [id number] No Take 1 tablet (300 mg) by mouth once daily. Nikki Mclaughlin MD Taking Active   fluticasone (Flonase) 50 mcg/actuation nasal spray [id number]  ADMINISTER 1 SPRAY INTO EACH NOSTRIL ONCE DAILY. SHAKE GENTLY. BEFORE FIRST USE, PRIME PUMP. AFTER USE, CLEAN TIP AND REPLACE CAP. Nikki Mclaughlin MD  Active   Discontinued 24 1240   Discontinued 24 1240    Discontinued 24 1241   montelukast (Singulair) 10 mg tablet [id number] No Take 1 tablet (10 mg) by mouth once daily. Nikki Mclaughlin MD Taking Active   pantoprazole (ProtoNix) 40 mg EC tablet [id number]  Take 1 tablet (40 mg) by mouth once daily in the morning. Take before meals. Do not crush, chew, or split. Nikki Mclaughlin MD   24 2359   Discontinued 24 1241   rizatriptan (Maxalt) 10 mg tablet [id number] No 1po under the tongue on onset of severe headache may repeat it again after 8 hours up to 2 a day Historical Provider, MD Not Taking Active   topiramate (Topamax) 25 mg tablet [id number] No Take 2 tablets (50 mg) by mouth 2 times a day. Nikki Mclaughlin MD Taking Active                Had meloxicam for knee, getting cortisone injections for the knee pain     ALLERGIES:  Allergies   Allergen Reactions    Amoxicillin Swelling     Hives and face swelling    Oxycodone Swelling     itching and all over swelling    Oxycodone-Acetaminophen Other    Penicillin G Unknown    Penicillin Rash       REVIEW OF SYSTEMS:  GENERAL: Negative for malaise, significant weight loss and fever  HEAD: Negative for headache, swelling.  NECK: Negative for lumps, goiter, pain and significant neck  swelling  RESPIRATORY: Negative for cough, wheezing or shortness of breath.  CARDIOVASCULAR: Negative for chest pain, leg swelling or palpitations.  GI: Negative for abdominal discomfort, blood in stools or black stools or change in bowel habits  : No history of dysuria, frequency or incontinence  MUSCULOSKELETAL: Negative for joint pain or swelling, back pain or muscle pain.  SKIN: Negative for lesions, rash, and itching.  PSYCH: Negative for sleep disturbance, mood disorder and recent psychosocial stressors.  ENDOCRINE: Negative for cold or heat intolerance, polyuria, polydipsia and goiter.    Objective   PHYSICAL EXAM:  Visit Vitals  OB Status Hysterectomy   Smoking Status Never     General appearance: obese, NAD  Neuro: AOx3  Head: EOMI; no swelling or lesions of scalp or face  ENT:  no lumps or lymphadenopathy, thyroid normal to palpation; oropharynx clear, no swelling or erythema, she has braces  Skin: warm, no erythema or rashes  Lungs: clear to percussion and auscultation  Heart: regular rhythm and S1, S2 normal  Abdomen: soft, non-tender, no masses, no organomegaly  Extremities: Normal exam of the extremities. No swelling or pain.  Psych: no hurried speech, no flight of ideas, normal affect    IMPRESSION:  Pamela Finn is a 43 y.o. female with a bmi of Body mass index is 48.42 kg/m². with the following diagnoses and co-morbidities:  LUIS ANTONIO, knee pain and all can be helped with weight loss.  She notes sweets to be her weakness.  We discussed the bypass and the sleeve at length.  I reassured her with the data with the weight loss and hair loss and complications.  We discussed nsaid use and both procedures.  We also discussed her propensity for sweets may make the bypass more attractive.      We discussed the bypass  at [facility] and all questions were answered. risks and benefits were discussed  The patient understands the risks and benefits of the procedure and how the procedure is performed. The patient  understands the risks include but are not limited to bleeding, infection, DVT, PE, pneumonia, myocardial infarction, leak along the staple lines, and weight regain. We discussed lifestyle changes necessary to be successful.        This patient does meet the criteria for a surgical weight loss procedure according to NIH guidelines.  The risks of sleeve gastrectomy, Pita-en-Y gastric bypass, and duodenal switch surgery including bleeding, leak, wound infection, dehydration, ulcers, internal hernia, DVT/PE, prolonged nausea/vomiting, incomplete resolution of associated medical conditions, reflux, weight regain, vitamin/mineral deficiencies, and death have been explained to the patient and Pamela Finn has expressed understanding and acceptance of them.     The increased risk of substance and alcohol abuse following bariatric surgery was discussed with the patient, along with the negative consequences of substance/alcohol use after surgery including addiction, worsening of mental health disorders, and injury to the stomach. The risk of smoking and vaping (tobacco or any other substance) after bariatric surgery was explained to the patient. This includes risk of anastamotic ulcers, gastritis, bleeding, perforation, stricture, and PO intolerance.  The patient expressed understanding and acceptance of these risks.      The benefits of the above surgeries including weight loss, improvement/resolution of associated medical and mental health conditions, improved mobility, and decreased mortality have been explained the the patient and Pamela Finn has expressed understanding and acceptance of them. Her complication risk is under 1%.     Assessment/Plan   PLAN:  The plan of treatment for Pamela Finn is to continue with the consultations and tests ordered today in hopes of qualifying for pre-operative clearance for bariatric surgery. This includes:      Make some lifestyle changes  No desserts except one  time/week  Consult Nutrition for education and MSWL  Consult Psychology  Consult cardiology  Consult pulmonology  Labs ordered  EGD  PCP for medical optimization  She is scheduled with sleep medicine and is prescribed CPAP therapy.   Please start using your cpap.     The following are some lifestyle changes you should begin to prepare you for your bypass surgery.   Eliminate soda and other carbonated beverages from your diet. Carbonation will not be well tolerated after surgery. Try Propel, Vitamin Water Zero, Sobe Lifewater, Crystal Light or water.    Increase fluid consumption to 64 oz daily. Do not drink within 30 minutes of eating as this will liquefy your food and make you hungry more quickly.    Exercise for 30-60 minutes daily. Brisk walking, bike riding and swimming are all examples of healthy exercise. If you are unable to exercise we recommend seated exercise.    Do not skip meals.    Take a multivitamin daily.    Lose weight. In preparation for your surgery it is important that you begin making healthier food choices now. Our dietitian will meet with you to help you select foods lower in calories and higher in nutrition. We would like you to lose at least 10  lbs prior to surgery.     Increase your protein intake to 60 grams per day.    Alcohol is empty calories. Please eliminate while preparing for surgery.    Plan your meals.      General Instruction: 1) Use the information we gave you today to work through your insurance requirements and medical clearances.   2) These documents need to get faxed to the program navigators so they can submit them for approval from your insurance company.   3) Obtain labs today at a  facility. We will call you with any abnormalities and corrections you need to make.   4) Continue to work with your primary care doctor and other specialist so your other health problems are well controlled prior to your surgery.   5) Adopt the recommendations of the program dietician so  you develop healthy eating patterns.   6) Work with the sleep team to get your sleep apnea treated to prevent other health problems .   7) Consider attending a support group to learn from other who have been through the process.   8) Come to the MSWL sessions.

## 2024-01-23 ENCOUNTER — APPOINTMENT (OUTPATIENT)
Dept: NEUROLOGY | Facility: CLINIC | Age: 44
End: 2024-01-23
Payer: COMMERCIAL

## 2024-01-24 ENCOUNTER — NUTRITION (OUTPATIENT)
Dept: SURGERY | Facility: CLINIC | Age: 44
End: 2024-01-24
Payer: COMMERCIAL

## 2024-01-24 ENCOUNTER — OFFICE VISIT (OUTPATIENT)
Dept: SURGERY | Facility: CLINIC | Age: 44
End: 2024-01-24
Payer: COMMERCIAL

## 2024-01-24 VITALS
WEIGHT: 291 LBS | HEART RATE: 82 BPM | HEIGHT: 65 IN | SYSTOLIC BLOOD PRESSURE: 127 MMHG | BODY MASS INDEX: 48.48 KG/M2 | DIASTOLIC BLOOD PRESSURE: 85 MMHG

## 2024-01-24 DIAGNOSIS — I10 BENIGN ESSENTIAL HYPERTENSION: ICD-10-CM

## 2024-01-24 DIAGNOSIS — G47.33 OBSTRUCTIVE SLEEP APNEA SYNDROME: ICD-10-CM

## 2024-01-24 DIAGNOSIS — E66.01 MORBID OBESITY (MULTI): ICD-10-CM

## 2024-01-24 DIAGNOSIS — Z98.84 BARIATRIC SURGERY STATUS: ICD-10-CM

## 2024-01-24 DIAGNOSIS — M25.562 PAIN IN BOTH KNEES, UNSPECIFIED CHRONICITY: ICD-10-CM

## 2024-01-24 DIAGNOSIS — M25.561 PAIN IN BOTH KNEES, UNSPECIFIED CHRONICITY: ICD-10-CM

## 2024-01-24 DIAGNOSIS — E66.01 MORBID (SEVERE) OBESITY DUE TO EXCESS CALORIES (MULTI): ICD-10-CM

## 2024-01-24 DIAGNOSIS — K21.9 GASTROESOPHAGEAL REFLUX DISEASE, UNSPECIFIED WHETHER ESOPHAGITIS PRESENT: ICD-10-CM

## 2024-01-24 DIAGNOSIS — E88.810 DYSMETABOLIC SYNDROME: ICD-10-CM

## 2024-01-24 PROCEDURE — 3008F BODY MASS INDEX DOCD: CPT | Performed by: SURGERY

## 2024-01-24 PROCEDURE — 3074F SYST BP LT 130 MM HG: CPT | Performed by: SURGERY

## 2024-01-24 PROCEDURE — 99215 OFFICE O/P EST HI 40 MIN: CPT | Performed by: SURGERY

## 2024-01-24 PROCEDURE — 3079F DIAST BP 80-89 MM HG: CPT | Performed by: SURGERY

## 2024-01-24 PROCEDURE — 1036F TOBACCO NON-USER: CPT | Performed by: SURGERY

## 2024-01-24 NOTE — PATIENT INSTRUCTIONS
Make some lifestyle changes  No desserts except one time/week  Consult Nutrition for education and MSWL  Consult Psychology  Consult cardiology  Consult pulmonology  Labs ordered  EGD  PCP for medical optimization  She is scheduled with sleep medicine and is prescribed CPAP therapy.   Please start using your cpap.     The following are some lifestyle changes you should begin to prepare you for your bypass surgery.   Eliminate soda and other carbonated beverages from your diet. Carbonation will not be well tolerated after surgery. Try Propel, Vitamin Water Zero, Sobe Lifewater, Crystal Light or water.    Increase fluid consumption to 64 oz daily. Do not drink within 30 minutes of eating as this will liquefy your food and make you hungry more quickly.    Exercise for 30-60 minutes daily. Brisk walking, bike riding and swimming are all examples of healthy exercise. If you are unable to exercise we recommend seated exercise.    Do not skip meals.    Take a multivitamin daily.    Lose weight. In preparation for your surgery it is important that you begin making healthier food choices now. Our dietitian will meet with you to help you select foods lower in calories and higher in nutrition. We would like you to lose at least 10  lbs prior to surgery.     Increase your protein intake to 60 grams per day.    Alcohol is empty calories. Please eliminate while preparing for surgery.    Plan your meals.      General Instruction: 1) Use the information we gave you today to work through your insurance requirements and medical clearances.   2) These documents need to get faxed to the program navigators so they can submit them for approval from your insurance company.   3) Obtain labs today at a  facility. We will call you with any abnormalities and corrections you need to make.   4) Continue to work with your primary care doctor and other specialist so your other health problems are well controlled prior to your surgery.   5)  Adopt the recommendations of the program dietician so you develop healthy eating patterns.   6) Work with the sleep team to get your sleep apnea treated to prevent other health problems .   7) Consider attending a support group to learn from other who have been through the process.   8) Come to the MSWL sessions.

## 2024-01-24 NOTE — PROGRESS NOTES
Initial Bariatric Nutrition Assessment    Surgeon:   Yvan   Patient is considering: Undecided     ASSESSMENT:  Current weight:         Initial start weight:   #289 ( from Oct 2023)- seeing Dr. ARANDA today in-person will obtain a wt there         Food allergies/intolerances:   No   Chewing/Swallowing/Dentition: No   Nausea / Vomiting / Hx Gastroparesis:  Nausea- on a medication- takes 2 times a day   Diarrhea/ Constipation: No   Smoking/Tobacco use: No   Vitamins/Minerals supplements: none   Hours of sleep/night: 5-6 hrs   Diet History: weight watchers, protein shake (premier shake)   Activity Level:low     24 HOUR RECALL/DIET HISTORY:  Breakfast:  skips or protein shake or cereal   Snack:  None  Lunch: skips    Snack: Bufflo chicken dip and snack size chip or none   Dinner: 1/2 steakum sandwich or salmon patties, rice and corn or subway   Snack: ice cream or none   Beverages: regular coke on occasion, regular gingeral more often, water,  2% milk on occasion, energy drink    Alcohol: No- stopped drinking 2 years ago     Person responsible for cooking & shopping?   Self and    How often do you eat sweet snacks?   2 times a week on average   How often do you eat savory snacks?  On occasion   How often do you eat out?   Rare   Do you feel overly stuffed?   No   Binge Eating?  No   Night Eating?  None   Emotional Eating?  None        READINESS TO LEARN:  Motivation to learn: Interested        Understanding of instruction: Good       Anticipated Compliance: Good     Family Support: U/A           Educational Materials Provided:    Pre-op Diet and sample menus                                             Nutrition Guidelines for Gastric Bypass and Sleeve Gastrectomy   Schedules for MSWL class and support group     Nutrition assessment completed today. Patient is seeking undecided- has an appt with Dr. Santoro this afternoon to decided surgery plan. Movement is low- has knee pain. Talked in detail today about need to  move her body consistently. Main fluid is water.  Does drink red bull when working long shift. Patient works security . More of a sweet eater than savory. Most of meals are made at home. Meals are inconsistent- usually eating 1-2 meals and snacks on occasion. Has used protein shakes in the past. Patient was asking several questions today and engaged in appt. Patient is aware of below goals and need for multiple F/Us pre-op to improve eating habits. Pleasant patient.     Patient was receptive to nutritional recommendations, asked numerous questions, and verbalized understanding of the weight loss surgery diet.  Patient expressed understanding about the importance of strict dietary compliance post-surgery to avoid nutritional deficiencies and achieve optimal weight loss and verbalized intent to follow dietary recommendations.    Malnutrition Screening:   Significant unintentional weight loss? n/a   Eating less than 75% of usual intake for more than 2 weeks? n/a      Nutrition Diagnosis:   Overweight/obesity related to excess energy intake as evidenced by BMI >= 40 kg/m^2.  Food- and nutrition-related knowledge deficit related to lack of prior exposure to surgical weight loss information as evidenced by pt new to surgical program.    Nutrition Interventions:   Modify type and amount of food and nutrients within meals and snacks.  Comprehensive Nutrition Education    Recommendations:  1. Structure meal patterns, eating three meals and 1-2 snacks per day. No skipping meals- Ok to use a protein shake for a meal replacement or need to meal prep. Do not go more than 4-5 hrs with out eating something   2.    Drink 64oz of calorie-free, caffeine-free, and non-carbonated beverages.-water, NO pop, No red bull unless you reach your fluid goal (they need to be sugar free)- decrease them overall.     4.    Stop drinking 30 minutes before meals, nothing with meals and wait 30 minutes after meals to drink again. Make meals last 30  minutes-chew thoroughly.   5.    Increase physical activity by 10-15 minutes as tolerated to an end goal of 60 minutes 5 x per week. Consistency is the key. Need to start sit down exercises, water aerobics or utube walking videos        Pre-op Goal weight: lose 5% of body weight    Nutrition Monitoring and Evaluation: 1-2 pound weight loss per week  Criteria: weight check  Need for Follow-up: 4-6 weeks (protein sources/ food options)     Patient does meet [employer] guidelines for weight loss surgery, however needs to demonstrate consistent effort in making dietary changes before giving clearance. It is anticipated that the patient will need at least 3-4 nutritional follow-up visits prior to clearance for surgery.        Cathy Hernandez RDN, LD

## 2024-01-25 ENCOUNTER — DOCUMENTATION (OUTPATIENT)
Dept: SURGERY | Facility: HOSPITAL | Age: 44
End: 2024-01-25
Payer: COMMERCIAL

## 2024-01-25 DIAGNOSIS — E66.01 MORBID (SEVERE) OBESITY DUE TO EXCESS CALORIES (MULTI): ICD-10-CM

## 2024-01-25 DIAGNOSIS — Z98.84 BARIATRIC SURGERY STATUS: ICD-10-CM

## 2024-01-25 NOTE — PROGRESS NOTES
Received pt's new patient initial nutri assessment forms and contracts, uploaded. Work list updated

## 2024-02-14 DIAGNOSIS — R51.9 NONINTRACTABLE HEADACHE, UNSPECIFIED CHRONICITY PATTERN, UNSPECIFIED HEADACHE TYPE: ICD-10-CM

## 2024-02-15 ENCOUNTER — OFFICE VISIT (OUTPATIENT)
Dept: ORTHOPEDIC SURGERY | Facility: CLINIC | Age: 44
End: 2024-02-15
Payer: COMMERCIAL

## 2024-02-15 ENCOUNTER — HOSPITAL ENCOUNTER (OUTPATIENT)
Dept: RADIOLOGY | Facility: CLINIC | Age: 44
Discharge: HOME | End: 2024-02-15
Payer: COMMERCIAL

## 2024-02-15 VITALS — BODY MASS INDEX: 48.48 KG/M2 | WEIGHT: 291 LBS | HEIGHT: 65 IN

## 2024-02-15 DIAGNOSIS — M17.0 LOCALIZED OSTEOARTHRITIS OF KNEES, BILATERAL: Primary | ICD-10-CM

## 2024-02-15 DIAGNOSIS — M17.0 LOCALIZED OSTEOARTHRITIS OF KNEES, BILATERAL: ICD-10-CM

## 2024-02-15 PROCEDURE — 1036F TOBACCO NON-USER: CPT | Performed by: PHYSICIAN ASSISTANT

## 2024-02-15 PROCEDURE — 99214 OFFICE O/P EST MOD 30 MIN: CPT | Performed by: PHYSICIAN ASSISTANT

## 2024-02-15 PROCEDURE — 73560 X-RAY EXAM OF KNEE 1 OR 2: CPT | Mod: BILATERAL PROCEDURE | Performed by: RADIOLOGY

## 2024-02-15 PROCEDURE — 2500000004 HC RX 250 GENERAL PHARMACY W/ HCPCS (ALT 636 FOR OP/ED): Performed by: PHYSICIAN ASSISTANT

## 2024-02-15 PROCEDURE — 20610 DRAIN/INJ JOINT/BURSA W/O US: CPT | Performed by: PHYSICIAN ASSISTANT

## 2024-02-15 PROCEDURE — 73560 X-RAY EXAM OF KNEE 1 OR 2: CPT | Mod: 50

## 2024-02-15 PROCEDURE — 3008F BODY MASS INDEX DOCD: CPT | Performed by: PHYSICIAN ASSISTANT

## 2024-02-15 PROCEDURE — 2500000005 HC RX 250 GENERAL PHARMACY W/O HCPCS: Performed by: PHYSICIAN ASSISTANT

## 2024-02-15 RX ORDER — LIDOCAINE HYDROCHLORIDE 10 MG/ML
5 INJECTION INFILTRATION; PERINEURAL
Status: COMPLETED | OUTPATIENT
Start: 2024-02-15 | End: 2024-02-15

## 2024-02-15 RX ORDER — TRIAMCINOLONE ACETONIDE 40 MG/ML
1 INJECTION, SUSPENSION INTRA-ARTICULAR; INTRAMUSCULAR
Status: COMPLETED | OUTPATIENT
Start: 2024-02-15 | End: 2024-02-15

## 2024-02-15 RX ADMIN — TRIAMCINOLONE ACETONIDE 1 ML: 40 INJECTION, SUSPENSION INTRA-ARTICULAR; INTRAMUSCULAR at 14:43

## 2024-02-15 RX ADMIN — LIDOCAINE HYDROCHLORIDE 5 ML: 10 INJECTION, SOLUTION INFILTRATION; PERINEURAL at 14:43

## 2024-02-15 ASSESSMENT — PAIN SCALES - GENERAL: PAINLEVEL_OUTOF10: 5 - MODERATE PAIN

## 2024-02-15 ASSESSMENT — PAIN - FUNCTIONAL ASSESSMENT: PAIN_FUNCTIONAL_ASSESSMENT: 0-10

## 2024-02-15 ASSESSMENT — PAIN DESCRIPTION - DESCRIPTORS: DESCRIPTORS: ACHING

## 2024-02-15 NOTE — LETTER
February 15, 2024     Patient: Pamela Finn   YOB: 1980   Date of Visit: 2/15/2024       To Whom It May Concern:    It is my medical opinion that Pamela Finn {Work release (duty restriction):52099}.    If you have any questions or concerns, please don't hesitate to call.         Sincerely,        Vanna Vazquez PA-C    CC: No Recipients

## 2024-02-15 NOTE — PROGRESS NOTES
HPI  Patient is a 43-year-old female with a history of localized osteoarthritis of the knees, bilaterally. She presents today for follow-up for left knee osteoarthritis and for repeat corticosteroid injection in her left knee only. Patient states that previous injections have provided her with good relief although she has noticed increased swelling in the left knee over the past few days.  She denies recent injury or trauma to the left knee, denies fever/chills, N/T or calf pain.    Patient is a 43 y.o. female with medical history significant for   Patient Active Problem List   Diagnosis    Abnormal screening mammogram    Abnormal vaginal bleeding    Acne    Skin lesion    Allergic rhinitis    Allergic rhinitis due to allergen    Arthritis of knee, left    Astigmatism    Hyperopia    Bariatric surgery status    Benign essential hypertension    Breast mass, left    Breast mass, right    Lump, breast    Bronchitis with bronchospasm    Chalazion    Chalazion of right upper eyelid    Chondromalacia    Chronic headaches    Dysmetabolic syndrome    Early satiety    Murmur, cardiac    GERD (gastroesophageal reflux disease)    Head ache    Increased frequency of urination    Urinary frequency    Insomnia    Irregular menses    Knee pain, bilateral    Knee pain    Localized osteoarthritis of knees, bilateral    Nausea and vomiting    Obstructive sleep apnea syndrome    Onychomycosis of toenail    Polyphagia    Presbyopia    Severe menstrual cramps    Shortness of breath    Sore throat    Stye external    Suspected sleep apnea    Tear of lateral meniscus of left knee    Viral infection    Morbid (severe) obesity due to excess calories (CMS/HCC)    Seasonal allergic rhinitis     ROS: All other systems have been reviewed and are negative except as previously noted in history of present illness.    Gen: The patient is alert and oriented ×3, is in no acute distress, and appear their stated age and weight.  Psychiatric: Mood and  affect are appropriate.  Eyes: Sclera are white, and pupils are round and symmetric.  ENT: Mucous membranes are moist.   Neck: Supple. Thyroid is midline.  Respiratory: Respirations are nonlabored, chest rise is symmetric.  Cardiac: Rate is regular by palpation of distal pulses.   Abdomen: Nondistended.  Integument: No obvious cutaneous lesions are noted. No signs of lymphangitis. No signs of systemic edema.    Musculoskeletal: LLE  skin intact, no wounds or breakdown noted  mild lower leg edema  TTP joint line medial and patellofemoral   no knee effusion noted  compartments soft  no calf tenderness  sensation intact to light touch  motor intact including TA/GS/EHL  palpable DP/PT pulses 2+  stable to valgus/varus stress exams at 30 degrees of flexion  negative Lachmans and posterior drawer  Positive patellar crepitus  knee motion: 0-120 degrees     XR: Prior images of left knee reviewed personally by me today and reveal mild arthritis with joint space narrowing noted in medial compartment, osteophyte formation    IMP:  Problem List Items Addressed This Visit       Localized osteoarthritis of knees, bilateral    Relevant Orders    XR knee 1-2 views bilateral         DISCUSSION: I discussed the conservative treatment options for osteoarthritis including physical therapy, NSAIDs and corticosteroid injection and briefly discussed indications for surgery. Patient should try to delay joint replacement surgery for as long as possible. If the patient's joint problem affects their quality of life and cause significant restriction of their activities, they may want to consider joint replacement surgery. I reviewed the importance for adopting a low impact lifestyle and avoidance of joint overloading activities. I explained the risks and benefits of the injection.  Patient verbalized understanding and wishes to proceed with cortisone injection for the left knee again today.     Patient ID: Pamela Finn is a 43 y.o.  female.    L Inj/Asp: L knee on 2/15/2024 2:43 PM  Indications: pain  Details: 21 G needle, anterolateral approach  Medications: 5 mL lidocaine 10 mg/mL (1 %); 1 mL triamcinolone acetonide 40 mg/mL  Outcome: tolerated well, no immediate complications  Consent was given by the patient. Immediately prior to procedure a time out was called to verify the correct patient, procedure, equipment, support staff and site/side marked as required. Patient was prepped and draped in the usual sterile fashion.           PLAN:  Patient should observe for signs of infection and/or adverse reactions (redness, significant increase in pain, fever, nausea or vomiting) after receiving an injection today. If you develop any of the above symptoms, please contact my office. Patient should see the maximum effect in 3 to 5 days and can receive another cortisone injection no sooner than 3 months from today. Patient will continue with activities as tolerated. Mobilize to prevent stiffness. May take Tylenol or ibuprofen as needed for the pain/swelling.  Follow up in 3 months, no x-rays needed. All questions were answered.

## 2024-02-15 NOTE — LETTER
February 15, 2024     Patient: Pamela Finn   YOB: 1980   Date of Visit: 2/15/2024       To Whom it May Concern:    Pamela Finn was seen in my clinic on 2/15/2024.    If you have any questions or concerns, please don't hesitate to call.         Sincerely,          Vanna Vazquez PA-C        CC: No Recipients

## 2024-02-18 RX ORDER — TOPIRAMATE 25 MG/1
50 TABLET ORAL 2 TIMES DAILY
Qty: 90 TABLET | Refills: 3 | Status: SHIPPED | OUTPATIENT
Start: 2024-02-18 | End: 2024-03-17

## 2024-02-29 ENCOUNTER — APPOINTMENT (OUTPATIENT)
Dept: SURGERY | Facility: CLINIC | Age: 44
End: 2024-02-29
Payer: COMMERCIAL

## 2024-03-14 DIAGNOSIS — R51.9 NONINTRACTABLE HEADACHE, UNSPECIFIED CHRONICITY PATTERN, UNSPECIFIED HEADACHE TYPE: ICD-10-CM

## 2024-03-17 RX ORDER — TOPIRAMATE 25 MG/1
50 TABLET ORAL 2 TIMES DAILY
Qty: 60 TABLET | Refills: 0 | Status: SHIPPED | OUTPATIENT
Start: 2024-03-17 | End: 2024-03-25
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
## 2024-03-25 ENCOUNTER — OFFICE VISIT (OUTPATIENT)
Dept: OBSTETRICS AND GYNECOLOGY | Facility: CLINIC | Age: 44
End: 2024-03-25
Payer: COMMERCIAL

## 2024-03-25 ENCOUNTER — LAB (OUTPATIENT)
Dept: LAB | Facility: LAB | Age: 44
End: 2024-03-25
Payer: COMMERCIAL

## 2024-03-25 VITALS
WEIGHT: 293 LBS | SYSTOLIC BLOOD PRESSURE: 127 MMHG | HEIGHT: 66 IN | DIASTOLIC BLOOD PRESSURE: 83 MMHG | BODY MASS INDEX: 47.09 KG/M2

## 2024-03-25 DIAGNOSIS — Z78.0 MENOPAUSE: ICD-10-CM

## 2024-03-25 DIAGNOSIS — N89.8 VAGINAL DRYNESS: ICD-10-CM

## 2024-03-25 DIAGNOSIS — Z01.419 ENCOUNTER FOR GYNECOLOGICAL EXAMINATION WITHOUT ABNORMAL FINDING: Primary | ICD-10-CM

## 2024-03-25 DIAGNOSIS — R51.9 NONINTRACTABLE HEADACHE, UNSPECIFIED CHRONICITY PATTERN, UNSPECIFIED HEADACHE TYPE: ICD-10-CM

## 2024-03-25 LAB
FSH SERPL-ACNC: 23.9 IU/L
LH SERPL-ACNC: 16.5 IU/L

## 2024-03-25 PROCEDURE — 83001 ASSAY OF GONADOTROPIN (FSH): CPT

## 2024-03-25 PROCEDURE — 99386 PREV VISIT NEW AGE 40-64: CPT | Performed by: OBSTETRICS & GYNECOLOGY

## 2024-03-25 PROCEDURE — 3079F DIAST BP 80-89 MM HG: CPT | Performed by: OBSTETRICS & GYNECOLOGY

## 2024-03-25 PROCEDURE — 87624 HPV HI-RISK TYP POOLED RSLT: CPT

## 2024-03-25 PROCEDURE — 1036F TOBACCO NON-USER: CPT | Performed by: OBSTETRICS & GYNECOLOGY

## 2024-03-25 PROCEDURE — 3008F BODY MASS INDEX DOCD: CPT | Performed by: OBSTETRICS & GYNECOLOGY

## 2024-03-25 PROCEDURE — 36415 COLL VENOUS BLD VENIPUNCTURE: CPT

## 2024-03-25 PROCEDURE — 83002 ASSAY OF GONADOTROPIN (LH): CPT

## 2024-03-25 PROCEDURE — 88175 CYTOPATH C/V AUTO FLUID REDO: CPT

## 2024-03-25 PROCEDURE — 3074F SYST BP LT 130 MM HG: CPT | Performed by: OBSTETRICS & GYNECOLOGY

## 2024-03-25 RX ORDER — ESTRADIOL 0.1 MG/G
0.5 CREAM VAGINAL 3 TIMES WEEKLY
Qty: 42.5 G | Refills: 3 | Status: SHIPPED | OUTPATIENT
Start: 2024-03-25 | End: 2025-03-25

## 2024-03-25 RX ORDER — TOPIRAMATE 25 MG/1
50 TABLET ORAL 2 TIMES DAILY
Qty: 360 TABLET | Refills: 1 | Status: SHIPPED | OUTPATIENT
Start: 2024-03-25

## 2024-03-25 NOTE — PROGRESS NOTES
Subjective   Pamela Finn is a 43 y.o. female here for a routine exam. Current complaints: This is a new patient to this practice.  She has a history of total laparoscopic hysterectomy with Dr. Anderson March 18, 2019 for fibroids and bleeding.  She is followed by the breast center for fibroadenoma, she is current on her breast imaging.  She denies bleeding.  She does notice vaginal dryness.  There is no dysuria, no change in bowel habits, no pelvic pain.. Personal health questionnaire reviewed: yes.     Gynecologic History  No LMP recorded. Patient has had a hysterectomy.  Contraception: status post hysterectomy  Last Pap: 5/4/17. Results were: normal  Last mammogram: 1/2/24. Results were: normal    Obstetric History  OB History   No obstetric history on file.       Objective   Constitutional: Alert and in no acute distress. Well developed, well nourished.   Head and Face: Head and face: Normal.    Eyes: Normal external exam - nonicteric sclera, extraocular movements intact (EOMI) and no ptosis.   Neck: No neck asymmetry. Supple. Thyroid not enlarged and there were no palpable thyroid nodules.    Pulmonary: No respiratory distress.   Chest: Breasts: Normal appearance, no nipple discharge and no skin changes. Palpation of breasts and axillae: No palpable mass and no axillary lymphadenopathy.   Abdomen: Soft nontender; no abdominal mass palpated. No organomegaly. No hernias.   Genitourinary: External genitalia: Normal. No inguinal lymphadenopathy. Bartholin's Urethral and Skenes Glands: Normal. Urethra: Normal.  Bladder: Normal on palpation. Vagina: Normal. Cervix: Absent uterus: Absent right Adnexa/parametria: Normal.  Left Adnexa/parametria: Normal.  Habitus limits exam. inspection of Perianal Area: Normal.   Musculoskeletal: No joint swelling seen, normal movements of all extremities.   Skin: Normal skin color and pigmentation, normal skin turgor, and no rash.   Neurologic: Non-focal. Grossly intact.    Psychiatric: Alert and oriented x 3. Affect normal to patient baseline. Mood: Appropriate.  Physical Exam     Assessment/Plan   Healthy female exam.  This is a 43-year-old female with a normal exam.  A vaginal Pap was sent.  We discussed likely no further Pap smears will be necessary.    She is current on breast imaging with the breast specialist.    We discussed some of her menopausal symptoms and vaginal dryness.  I ordered an FSH.  A recent TSH in October 2023 was normal.    We did discuss using estradiol cream for dryness.  She will use a pea-sized amount at the vaginal opening every night for 2 weeks, then 3 times weekly thereafter.  It could take 8 to 12 weeks or longer for the full effect.    I will see her in 1 year.  Education reviewed: self breast exams.

## 2024-03-26 ENCOUNTER — APPOINTMENT (OUTPATIENT)
Dept: NEUROLOGY | Facility: CLINIC | Age: 44
End: 2024-03-26
Payer: COMMERCIAL

## 2024-03-26 NOTE — RESULT ENCOUNTER NOTE
The FSH is not quite in the menopause range of 30, it is 23.9.  Definitely your symptoms and this number is consistent with the perimenopause.  You may find over-the-counter supplements like Estroven, black cohosh, relizen could be helpful for demi-menopausal symptoms.

## 2024-03-28 ENCOUNTER — APPOINTMENT (OUTPATIENT)
Dept: SLEEP MEDICINE | Facility: CLINIC | Age: 44
End: 2024-03-28
Payer: COMMERCIAL

## 2024-04-02 ENCOUNTER — PATIENT MESSAGE (OUTPATIENT)
Dept: SURGERY | Facility: CLINIC | Age: 44
End: 2024-04-02
Payer: COMMERCIAL

## 2024-04-17 ENCOUNTER — TELEPHONE (OUTPATIENT)
Dept: SURGERY | Facility: CLINIC | Age: 44
End: 2024-04-17
Payer: COMMERCIAL

## 2024-04-17 NOTE — TELEPHONE ENCOUNTER
Unable to reach patient, left voicemail message for patient to return the call.     Patient needs appt with MELONY Rosa Maria for MWL -pre surgery.

## 2024-04-25 ENCOUNTER — TELEMEDICINE (OUTPATIENT)
Dept: SURGERY | Facility: CLINIC | Age: 44
End: 2024-04-25
Payer: COMMERCIAL

## 2024-04-25 VITALS — WEIGHT: 293 LBS | BODY MASS INDEX: 47.09 KG/M2 | HEIGHT: 66 IN

## 2024-04-25 DIAGNOSIS — E88.810 DYSMETABOLIC SYNDROME: ICD-10-CM

## 2024-04-25 DIAGNOSIS — Z98.84 BARIATRIC SURGERY STATUS: ICD-10-CM

## 2024-04-25 DIAGNOSIS — E66.01 MORBID OBESITY (MULTI): Primary | ICD-10-CM

## 2024-04-25 PROCEDURE — 99214 OFFICE O/P EST MOD 30 MIN: CPT

## 2024-04-25 PROCEDURE — 3008F BODY MASS INDEX DOCD: CPT

## 2024-04-25 NOTE — PROGRESS NOTES
Subjective     Patient ID: Pamela Finn is a 43 y.o. female who presents for weight loss.    HPI  Patient had seen Dr. Kumar for her initial visit to inquire regarding bariatric surgery. He was referred to me by Dr. Kumar for weight loss before surgery. Few years ago She has been using protein shakes as meal replacements for breakfast and lunch, increased her water consumption and quit pop, which brought her to 65 lbs weight loss in 8 months. She has gained all her weight back within the last year. She tried this protein method again, but she was not consistent with it.     Initial Weight: 293 lbs  Initial BMI: 48  Current Weight:   Current weight:  Total Weight Loss:      Highest weight ever of 299 lbs   Lowest weight ever of 130 lbs     Initial Onset of obesity was in childhood.     The patient has tried multiple diets to lose weight including meal replacement shakes - lost 75 lbs    Dietary weakness: irregular eating patterns, doesn't eat until late in the day, then eats high fat foods like ice cream, also drinks coke to help with migraines.    Exercise - denies    Past Medical History:   Diagnosis Date    Allergies     seasonal    Arthritis     left knee    Migraines     Sleep apnea     has CPAP    Stomach disorder     nausea after eating, started 2023      Past Surgical History:   Procedure Laterality Date    BREAST BIOPSY Right     benign (fibroadenoma)    CHOLECYSTECTOMY  2004    laparascopic    KNEE Left 2020    meniscus repair    OTHER SURGICAL HISTORY  04/02/2019    Laparascopic Hysterectomy    OTHER SURGICAL HISTORY Right 2009    sweat gland removal      Family History   Problem Relation Name Age of Onset    Hypertension Mother      Alcohol abuse Father      Diabetes Father      Hypertension Father      Glaucoma Maternal Grandfather      Breast cancer Other Grandparent       Social History     Tobacco Use   Smoking Status Never   Smokeless Tobacco Never      Social History     Substance and Sexual  "Activity   Drug Use Never      Social History     Substance and Sexual Activity   Alcohol Use Not Currently    Comment: no alcohol in 3 yrs.        Allergies  Allergies   Allergen Reactions    Amoxicillin Swelling     Hives and face swelling    Oxycodone Swelling     itching and all over swelling    Oxycodone-Acetaminophen Other    Penicillin G Unknown    Penicillin Rash        VITALS  Visit Vitals  Ht 1.664 m (5' 5.51\")   Wt 133 kg (293 lb)   BMI 48.00 kg/m²   OB Status Hysterectomy   Smoking Status Never   BSA 2.48 m²        LABS  Lab Results   Component Value Date/Time    TSH 1.32 10/14/2023 0852    VITD25 10 (L) 10/14/2023 0852           No lab exists for component: \"LABALBU\"  Lab Results   Component Value Date    GLUCOSE 91 10/14/2023    CALCIUM 9.6 10/14/2023     10/14/2023    K 4.5 10/14/2023    CO2 27 10/14/2023     10/14/2023    BUN 11 10/14/2023    CREATININE 0.78 10/14/2023       ROS  Review of Systems  Negative unless noted in HPI    PHYSICAL EXAM  Physical Exam  General- No acute distress, well appearing and well nourished. Obese  HEENT - no erythema, swelling or discharge. Neck supple, no cervical lymphadenopathy.   Pulmonary - respiratory effort normal, lungs CTAB.   Cardiovascular - HRR, no m/r/g  Extremities - no edema and/or varicosities, no peripheral edema  Abdomen - Soft, Non-tender, non-distended, no abdominal masses.   Musculoskeletal - Range of motion WNL  Skin - normal without rashes or lesions.  Neurologic - reflexes intact, coordination WNL, gait WNL  Psychiatric - AXO x3, mood and affect appropriate      ASSESSMENT/PLAN  Patient here for weight loss prior to her bariatric surgery.   Assessment/Plan   Problem List Items Addressed This Visit       Bariatric surgery status    Dysmetabolic syndrome    BMI 45.0-49.9, adult (Multi)    Morbid obesity (Multi) - Primary      Initial Weight: 293 lbs  Initial BMI: 48    Reviewed past and active medical history, patient has no current " contraindications to use of medication for adjunct treatment in weight management.  Discussed that patient has to start with the lifestyle modifications, such as adjust her diet and start with daily exercise. She has to adhere to the new lifestyle for 4-6 weeks, keep her food and exercise logs. These modifications will help the patient loose some weight, which will be considered a good zoey effort. After that we can start adding weight loss medications. Reviewed that medications for weight loss may be short or long term, but that if weight loss is not realized within 12 weeks of initiating, medication will be discontinued to review alternative treatment options.    1500 maren meal plans, High Protein Snacks, [employer] Shopping List, Protein Count Guide and other handouts given.  Nutrition consult is placed.  Emphasized that medications are used as adjunct to diet and exercise for weight management    Patient used to see Karon Mast and was given Adipex (Phentermine), but she did not like the way it made her feel.   She is already on Wellbutrin 300 mg for weight management and Topiramate 50 mg bid for headaches.   Discussed with the patient that these medications are used for appetite control and weight management.     Counseled on dietary patterns to support weight loss and need for regular aerobic and strength based exercises to enhance weight loss and maintenance.      > 50% of time spent counseling on the importance of following recommended dietary modifications including: role of diet, low calorie and carbohydrate restrictions, limiting fast food and avoiding high sugar beverages.   Also discussed, the role of exercise with an ultimate goal of at least 250-300 minutes a week for weight loss and weight maintenance.    Follow-up: 1-2 months

## 2024-04-30 ENCOUNTER — TELEPHONE (OUTPATIENT)
Dept: SURGERY | Facility: CLINIC | Age: 44
End: 2024-04-30

## 2024-04-30 PROBLEM — E66.01 MORBID OBESITY (MULTI): Status: ACTIVE | Noted: 2024-04-30

## 2024-04-30 NOTE — TELEPHONE ENCOUNTER
Patient is asking MELONY Rosa Maria to send the list of recommended medications via My Chart, so that she is able to ask insurance if they are covered.

## 2024-05-01 NOTE — PATIENT INSTRUCTIONS
- Check insurance to see if any of the following medications are covered and we will consider appropriate addition of possible medications to assist with weight management at followup:   1. Phentermine  2. Topiramate   3. Qsymia  4. Wellbutrin  5. Contrave  6. Saxenda  7. Wegovy/Semaglutide   8. Zepbound/Tirzepatide   9. Trulicity     *Please schedule with outpatient dietitian to optimize the dietary recommendations below to your individual food preferences and dietary patterns by calling [phone]*    *If lab work ordered for you today - complete prior to next visit - labs are fasting*    A few key concepts for weight management:  1. Calorie control is necessary for weight loss  2. Protein prioritization helps control appetite and helps to maintain lean/muscle body mass with weight loss  3. Fiber rich foods ensure balanced blood sugar and help you stay full, also high nutrient foods  4. Consistency is critical for long term success  5. Successful weight loss requires trade offs - we acknowledge that meal planning and some element of preparation for most days of the week is necessary for success  6. Set an environment for success - keep binge-worthy processed food products out of the home as much as possible  7. Tracking some component of your nutrition intake (whether it's calories, macros, carbs, meal structure, etc) is a tool to help you stay consistent and accountable  8. Regular physical activity is critical for weight loss maintenance. Exercise helps us maintain lean body mass (muscle mass) which helps to maintain a higher metabolic rate (how many calories your burn daily). Without maintaining lean mass/muscle mass, weight loss is more difficult to sustain long term.     Detailed Diet Recommendations:  Self monitoring through some metric can be a tool to help with weight loss. Measuring portions can be one way to monitor your nutrition intake to support health improvement.   - Some apps you can use are  "Myfitnesspal, Lose It, Carb Manager, Cronometer, etc. Self monitoring is an extremely useful tool however please recognize that external tracking devices for activity and calories are not perfect, underestimating of total calorie intake is common, as well as overestimation through activity trackers of total calorie expenditure.     Please limit processed foods in the diet as they are known to contribute to obesity and weight gain. Processed foods include food items like bakery, chips, snack crackers, cereals, cookies, some pasta/breads, etc. These foods will typically come from \"bag, boxes with barcodes\" from the middle aisles of the grocery store - they contain added sugars and refined flours, are low in fiber and may stimulate appetite rather than help manage it effectively.     Increasing your intake of HIGH FIBER carbohydrates sources to increase fullness and appetite regulation with meals and between meals. High fiber foods are vegetables, fruit, beans, lentils, nuts/seeds, etc.      WHAT WILL MY PLATE LOOK LIKE IF I EAT LIKE THIS?.    - Aim for MINIMUM 25-30g of protein at meals: approx 4 oz of high protein food like chicken, fish, turkey, beef, pork, 2-3 eggs, 1/2-3/4 cup cottage cheese or plain greek yogurt. At each meal EAT PROTEIN FIRST! This sets the stage to help better regulate your appetite during and between meals. Think \"Palm of your hand portion of protein\" at each meal.     - The remainder of the plate is optimized to increase quality of the diet by including plenty of vegetables, appropriate fruit or starch portions: Aim for \"2 fist sized portions of fruits and vegetables with each meal\". A \"fist sized\" portion is about 1 cup.   - Choose a variety of vegetables, fruits and whole grains - aim to eat a wide variety of colors to improve quality of diet.     Fat included with meals is best in small portions - Fat is calorie dense so it is important to monitor the portion, to avoid excess calories. " Keep servings to the followin-2 tbsp olive/avocado/coconut oil (can be used to cook or dress vegetables), 1-2 tbsp unsweetened nut butter (peanut, almond, etc), 5-6 olives, 1/3 avocado, 1/2-1 oz nuts/seeds likes walnuts, almonds, pecans, brazil nuts, etc. This would include if you are cooking your meal with oil/butter/etc.     Please avoid liquid sources of calories as we often do not think about their contribution to our total daily calorie intake nor do they help regulate appetite when working towards weight loss. Hydration is important, aim for at least 64 oz zero calorie beverages daily, ideally water.     Exercise Recommendations:   Continue regular exercise regimen - you are following an appropriate program of 4x/week of resistance training & aerobic training for 60-90 minutes per session, achieving > than 200 minute per week goal.    Web based resources for meal planning:  www.Soshowise.Netseer - This is a website authored by registered dietitians, has many great resources for healthy nutrition.     Follow up: 1-2 months    If appointment was not scheduled before leaving today please call [phone] to speak with an .    Follow up visits are typically VIRTUAL  - Please have a reliable scale to weigh yourself at home for follow up visits  - Please have a blood pressure cuff to monitor blood pressure & heart rate at home (can be purchased over the counter, on Amazon, drug store, etc).   - For virtual visits you must be in the State of [place]  - YOU CANNOT BE DRIVING, please remember this is an appointment and should be treated the same as if you were in the office for follow up appointment.

## 2024-05-09 ENCOUNTER — APPOINTMENT (OUTPATIENT)
Dept: SURGERY | Facility: CLINIC | Age: 44
End: 2024-05-09
Payer: COMMERCIAL

## 2024-05-23 ENCOUNTER — OFFICE VISIT (OUTPATIENT)
Dept: ORTHOPEDIC SURGERY | Facility: CLINIC | Age: 44
End: 2024-05-23
Payer: COMMERCIAL

## 2024-05-23 VITALS — WEIGHT: 293 LBS | HEIGHT: 65 IN | BODY MASS INDEX: 48.82 KG/M2

## 2024-05-23 DIAGNOSIS — M17.0 LOCALIZED OSTEOARTHRITIS OF KNEES, BILATERAL: Primary | ICD-10-CM

## 2024-05-23 PROCEDURE — 1036F TOBACCO NON-USER: CPT | Performed by: PHYSICIAN ASSISTANT

## 2024-05-23 PROCEDURE — 2500000004 HC RX 250 GENERAL PHARMACY W/ HCPCS (ALT 636 FOR OP/ED): Performed by: PHYSICIAN ASSISTANT

## 2024-05-23 PROCEDURE — 99214 OFFICE O/P EST MOD 30 MIN: CPT | Performed by: PHYSICIAN ASSISTANT

## 2024-05-23 PROCEDURE — 20610 DRAIN/INJ JOINT/BURSA W/O US: CPT | Performed by: PHYSICIAN ASSISTANT

## 2024-05-23 PROCEDURE — 3008F BODY MASS INDEX DOCD: CPT | Performed by: PHYSICIAN ASSISTANT

## 2024-05-23 PROCEDURE — 2500000005 HC RX 250 GENERAL PHARMACY W/O HCPCS: Performed by: PHYSICIAN ASSISTANT

## 2024-05-23 RX ORDER — TRIAMCINOLONE ACETONIDE 40 MG/ML
1 INJECTION, SUSPENSION INTRA-ARTICULAR; INTRAMUSCULAR
Status: COMPLETED | OUTPATIENT
Start: 2024-05-23 | End: 2024-05-23

## 2024-05-23 RX ORDER — LIDOCAINE HYDROCHLORIDE 10 MG/ML
5 INJECTION INFILTRATION; PERINEURAL
Status: COMPLETED | OUTPATIENT
Start: 2024-05-23 | End: 2024-05-23

## 2024-05-23 RX ADMIN — TRIAMCINOLONE ACETONIDE 1 ML: 40 INJECTION, SUSPENSION INTRA-ARTICULAR; INTRAMUSCULAR at 13:09

## 2024-05-23 RX ADMIN — LIDOCAINE HYDROCHLORIDE 5 ML: 10 INJECTION, SOLUTION INFILTRATION; PERINEURAL at 13:09

## 2024-05-23 ASSESSMENT — PAIN DESCRIPTION - DESCRIPTORS: DESCRIPTORS: ACHING

## 2024-05-23 ASSESSMENT — PAIN SCALES - GENERAL: PAINLEVEL_OUTOF10: 5 - MODERATE PAIN

## 2024-05-23 ASSESSMENT — PAIN - FUNCTIONAL ASSESSMENT: PAIN_FUNCTIONAL_ASSESSMENT: 0-10

## 2024-05-23 NOTE — LETTER
May 23, 2024     Patient: Pamela Finn   YOB: 1980   Date of Visit: 5/23/2024       To Whom It May Concern:    It is my medical opinion that Pamela Finn may return to work on Tuesday, May 28, 2024 to allow her time to recover from left knee injection procedure .    If you have any questions or concerns, please don't hesitate to call.         Sincerely,        Vanna Vazquez PA-C    CC: No Recipients

## 2024-05-23 NOTE — PROGRESS NOTES
HPI  Patient is a 43 y.o. female with medical history significant for   Patient Active Problem List   Diagnosis    Abnormal screening mammogram    Abnormal vaginal bleeding    Acne    Skin lesion    Allergic rhinitis    Allergic rhinitis due to allergen    Arthritis of knee, left    Astigmatism    Hyperopia    Bariatric surgery status    Benign essential hypertension    Breast mass, left    Breast mass, right    Lump, breast    Bronchitis with bronchospasm    Chalazion    Chalazion of right upper eyelid    Chondromalacia    Chronic headaches    Dysmetabolic syndrome    Early satiety    Murmur, cardiac    GERD (gastroesophageal reflux disease)    Head ache    Increased frequency of urination    Urinary frequency    Insomnia    Irregular menses    Knee pain, bilateral    Knee pain    Localized osteoarthritis of knees, bilateral    Nausea and vomiting    Obstructive sleep apnea syndrome    Onychomycosis of toenail    Polyphagia    Presbyopia    Severe menstrual cramps    Shortness of breath    Sore throat    Stye external    Suspected sleep apnea    Tear of lateral meniscus of left knee    Viral infection    Morbid (severe) obesity due to excess calories (Multi)    Seasonal allergic rhinitis    BMI 45.0-49.9, adult (Multi)    Morbid obesity (Multi)     She presents today for follow-up for bilateral knee osteoarthritis and for repeat corticosteroid injection in her left knee only. Patient states that previous injections have provided her with good relief although her last injection resulted in increased knee pain for several days before she felt pain relief.   She denies recent injury or trauma to the left knee, denies fever/chills, N/T or calf pain.  She is agreeable to repeating the cortisone injection again today.    ROS: All other systems have been reviewed and are negative except as previously noted in history of present illness.    Gen: The patient is alert and oriented ×3, is in no acute distress, and appear  their stated age and weight.  Psychiatric: Mood and affect are appropriate.  Eyes: Sclera are white, and pupils are round and symmetric.  ENT: Mucous membranes are moist.   Neck: Supple. Thyroid is midline.  Respiratory: Respirations are nonlabored, chest rise is symmetric.  Cardiac: Rate is regular by palpation of distal pulses.   Abdomen: Nondistended.  Integument: No obvious cutaneous lesions are noted. No signs of lymphangitis. No signs of systemic edema.    Musculoskeletal: LLE  skin intact, no wounds or breakdown noted  mild lower leg edema  TTP joint line medial and patellofemoral   no knee effusion noted  compartments soft  no calf tenderness  sensation intact to light touch  motor intact including TA/GS/EHL  palpable DP/PT pulses 2+  stable to valgus/varus stress exams at 30 degrees of flexion  knee motion: 0-120 degrees     XR: Prior images of left knee reviewed personally by me today and reveal mild arthritis with joint space narrowing noted in patellofemoral compartment, osteophyte formation    IMP:  Problem List Items Addressed This Visit       Localized osteoarthritis of knees, bilateral - Primary            DISCUSSION: I discussed the conservative treatment options for osteoarthritis including physical therapy, NSAIDs and corticosteroid injection and briefly discussed indications for surgery. Patient should try to delay joint replacement surgery for as long as possible. If the patient's joint problem affects their quality of life and cause significant restriction of their activities, they may want to consider joint replacement surgery. I reviewed the importance for adopting a low impact lifestyle and avoidance of joint overloading activities. I explained the risks and benefits of the injection.  Patient verbalized understanding and wishes to proceed with cortisone injection for the left knee again today.     Patient ID: Pamela Finn is a 43 y.o. female.    L Inj/Asp: L knee on 5/23/2024 1:09  PM  Indications: pain  Details: 21 G needle, anterolateral approach  Medications: 5 mL lidocaine 10 mg/mL (1 %); 1 mL triamcinolone acetonide 40 mg/mL  Outcome: tolerated well, no immediate complications  Consent was given by the patient. Immediately prior to procedure a time out was called to verify the correct patient, procedure, equipment, support staff and site/side marked as required. Patient was prepped and draped in the usual sterile fashion.           PLAN:  Patient should observe for signs of infection and/or adverse reactions (redness, significant increase in pain, fever, nausea or vomiting) after receiving an injection today. If you develop any of the above symptoms, please contact my office. Patient should see the maximum effect in 3 to 5 days and can receive another cortisone injection no sooner than 3 months from today. Patient will continue with activities as tolerated. Mobilize to prevent stiffness. May take Tylenol or ibuprofen as needed for the pain/swelling.  Follow up in 3 months, no x-rays needed. All questions were answered.

## 2024-06-18 ENCOUNTER — OFFICE VISIT (OUTPATIENT)
Dept: PULMONOLOGY | Facility: CLINIC | Age: 44
End: 2024-06-18
Payer: COMMERCIAL

## 2024-06-18 ENCOUNTER — OFFICE VISIT (OUTPATIENT)
Dept: CARDIOLOGY | Facility: CLINIC | Age: 44
End: 2024-06-18
Payer: COMMERCIAL

## 2024-06-18 VITALS
HEIGHT: 66 IN | BODY MASS INDEX: 46.61 KG/M2 | OXYGEN SATURATION: 99 % | WEIGHT: 290 LBS | DIASTOLIC BLOOD PRESSURE: 84 MMHG | HEART RATE: 49 BPM | SYSTOLIC BLOOD PRESSURE: 136 MMHG

## 2024-06-18 VITALS
TEMPERATURE: 97.3 F | BODY MASS INDEX: 45.96 KG/M2 | RESPIRATION RATE: 18 BRPM | WEIGHT: 286 LBS | HEIGHT: 66 IN | DIASTOLIC BLOOD PRESSURE: 82 MMHG | HEART RATE: 60 BPM | SYSTOLIC BLOOD PRESSURE: 128 MMHG | OXYGEN SATURATION: 98 %

## 2024-06-18 DIAGNOSIS — M25.562 PAIN IN BOTH KNEES, UNSPECIFIED CHRONICITY: ICD-10-CM

## 2024-06-18 DIAGNOSIS — E88.810 DYSMETABOLIC SYNDROME: ICD-10-CM

## 2024-06-18 DIAGNOSIS — I10 BENIGN ESSENTIAL HYPERTENSION: ICD-10-CM

## 2024-06-18 DIAGNOSIS — G47.33 OBSTRUCTIVE SLEEP APNEA SYNDROME: ICD-10-CM

## 2024-06-18 DIAGNOSIS — R00.1 BRADYCARDIA: ICD-10-CM

## 2024-06-18 DIAGNOSIS — E66.01 MORBID (SEVERE) OBESITY DUE TO EXCESS CALORIES (MULTI): ICD-10-CM

## 2024-06-18 DIAGNOSIS — K21.9 GASTROESOPHAGEAL REFLUX DISEASE, UNSPECIFIED WHETHER ESOPHAGITIS PRESENT: ICD-10-CM

## 2024-06-18 DIAGNOSIS — Z98.84 BARIATRIC SURGERY STATUS: Primary | ICD-10-CM

## 2024-06-18 DIAGNOSIS — Z98.84 BARIATRIC SURGERY STATUS: ICD-10-CM

## 2024-06-18 DIAGNOSIS — M25.561 PAIN IN BOTH KNEES, UNSPECIFIED CHRONICITY: ICD-10-CM

## 2024-06-18 DIAGNOSIS — Z01.810 ENCOUNTER FOR PRE-OPERATIVE CARDIOVASCULAR CLEARANCE: Primary | ICD-10-CM

## 2024-06-18 PROCEDURE — 3008F BODY MASS INDEX DOCD: CPT | Performed by: INTERNAL MEDICINE

## 2024-06-18 PROCEDURE — 1036F TOBACCO NON-USER: CPT | Performed by: INTERNAL MEDICINE

## 2024-06-18 PROCEDURE — 3075F SYST BP GE 130 - 139MM HG: CPT

## 2024-06-18 PROCEDURE — 3079F DIAST BP 80-89 MM HG: CPT | Performed by: INTERNAL MEDICINE

## 2024-06-18 PROCEDURE — 1036F TOBACCO NON-USER: CPT

## 2024-06-18 PROCEDURE — 3079F DIAST BP 80-89 MM HG: CPT

## 2024-06-18 PROCEDURE — 3008F BODY MASS INDEX DOCD: CPT

## 2024-06-18 PROCEDURE — 99213 OFFICE O/P EST LOW 20 MIN: CPT | Performed by: INTERNAL MEDICINE

## 2024-06-18 PROCEDURE — 99204 OFFICE O/P NEW MOD 45 MIN: CPT

## 2024-06-18 PROCEDURE — 93005 ELECTROCARDIOGRAM TRACING: CPT

## 2024-06-18 PROCEDURE — 3074F SYST BP LT 130 MM HG: CPT | Performed by: INTERNAL MEDICINE

## 2024-06-18 PROCEDURE — 99203 OFFICE O/P NEW LOW 30 MIN: CPT | Performed by: INTERNAL MEDICINE

## 2024-06-18 ASSESSMENT — PAIN SCALES - GENERAL: PAINLEVEL: 0-NO PAIN

## 2024-06-18 NOTE — PROGRESS NOTES
Referred by Dr. 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     Has not started a consistent exercise routine. She has been walking outside at least 2-3 times per week (2 miles). When she goes to the gym (has been there twice) treadmill 30 minutes 2.7 miles/hr with an incline of 4-5%. Bicycle for 30 minutes.   Patient denies chest pain and angina.  Pt denies shortness of breath, dyspnea on exertion, orthopnea, and paroxysmal nocturnal dyspnea.  Pt denies worsening lower extremity edema.  Pt denies palpitations or syncope.  No recent falls.  No fever or chills.  No cough.  No change in bowel or bladder habits.  No sick contacts.  No recent travel.      Past Medical History:  She has a past medical history of Allergies, Arthritis, Migraines, Sleep apnea, and Stomach disorder.    Past Surgical History:  She has a past surgical history that includes Other surgical history (04/02/2019); Breast biopsy (Right); XR knee (Left, 2020); Cholecystectomy (2004); and Other surgical history (Right, 2009).      Social History:  She reports that she has never smoked. She has never used smokeless tobacco. She reports that she does not currently use alcohol. She reports that she does not use drugs.    Family History:  Family History   Problem Relation Name Age of Onset    Hypertension Mother      Alcohol abuse Father      Diabetes Father      Hypertension Father      Glaucoma Maternal Grandfather      Breast cancer Other Grandparent         Allergies:  Amoxicillin, Animal dander, House dust mite, Mold extracts, Oxycodone, Oxycodone-acetaminophen, Penicillin g, Pollen extracts, and Penicillin    Outpatient Medications:  Current Outpatient Medications   Medication  "Instructions    azelastine (Optivar) 0.05 % ophthalmic solution ophthalmic (eye)    buPROPion XL (WELLBUTRIN XL) 300 mg, oral, Daily    estradiol (ESTRACE) 0.5 g, vaginal, 3 times weekly, Use a pea-sized amount at the vaginal opening nightly for 2 weeks, then 3 times weekly thereafter.    fluticasone (Flonase) 50 mcg/actuation nasal spray 1 spray, Each Nostril, Daily, Shake gently. Before first use, prime pump. After use, clean tip and replace cap.    montelukast (SINGULAIR) 10 mg, oral, Daily    pantoprazole (PROTONIX) 40 mg, oral, Daily before breakfast, Do not crush, chew, or split.    rizatriptan (Maxalt) 10 mg tablet 1po under the tongue on onset of severe headache may repeat it again after 8 hours up to 2 a day    topiramate (TOPAMAX) 50 mg, oral, 2 times daily        Last Recorded Vitals:  Vitals:    06/18/24 0943   BP: 136/84   Pulse: (!) 49   SpO2: 99%   Weight: 132 kg (290 lb)   Height: 1.676 m (5' 6\")       Physical Exam  Constitutional:       Appearance: Normal appearance. She is morbidly obese.   Neck:      Vascular: No carotid bruit.   Cardiovascular:      Rate and Rhythm: Normal rate and regular rhythm.      Heart sounds: No murmur heard.  Pulmonary:      Effort: Pulmonary effort is normal.      Breath sounds: Normal breath sounds.   Abdominal:      Palpations: Abdomen is soft.   Skin:     General: Skin is warm.   Neurological:      Mental Status: She is alert and oriented to person, place, and time.   Psychiatric:         Mood and Affect: Mood normal.          Last Labs:  CBC -  Lab Results   Component Value Date    WBC 5.2 10/14/2023    HGB 13.3 10/14/2023    HCT 42.3 10/14/2023    MCV 88 10/14/2023     10/14/2023       CMP -  Lab Results   Component Value Date    CALCIUM 9.6 10/14/2023    PROT 7.3 10/14/2023    ALBUMIN 3.9 10/14/2023    AST 14 10/14/2023    ALT 11 10/14/2023    ALKPHOS 83 10/14/2023    BILITOT 0.5 10/14/2023       LIPID PANEL -   Lab Results   Component Value Date    CHOL " 177 10/14/2023    TRIG 51 10/14/2023    HDL 55.3 10/14/2023    CHHDL 3.2 10/14/2023    LDLF 93 10/14/2022    VLDL 10 10/14/2023    NHDL 122 10/14/2023       RENAL FUNCTION PANEL -   Lab Results   Component Value Date    GLUCOSE 91 10/14/2023     10/14/2023    K 4.5 10/14/2023     10/14/2023    CO2 27 10/14/2023    ANIONGAP 13 10/14/2023    BUN 11 10/14/2023    CREATININE 0.78 10/14/2023    CALCIUM 9.6 10/14/2023    ALBUMIN 3.9 10/14/2023        Lab Results   Component Value Date    HGBA1C 5.4 10/14/2023       Last Cardiology Tests:  ECG:  Sinus Bradycardia 49 bpm    Assessment/Plan     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     Has not started a consistent exercise routine. She has been walking outside at least 2-3 times per week (2 miles). When she goes to the gym (has been there twice) treadmill 30 minutes 2.7 miles/hr with an incline of 4-5%. Bicycle for 30 minutes. Her functional capacity is > 4 METS and she does not have any cardiac concerns or complaints. EKG today Sinus Bradycardia at 49 bpm. She is asymptomatic. Most recent TSH was done 6 months ago and was normal. However, she she has a previous order for lab panel which includes TSH with reflexive T4 . She was reminded to obtain. No further cardiac testing is needed at this time. She is a low cardiac risk for gastric sleeve.    Will follow up in 1 week to review TSH    Soraya Mckeon, APRN-CNP

## 2024-06-18 NOTE — PROGRESS NOTES
"    Department of Medicine  Division of Pulmonary, Critical Care, and Sleep Medicine  Location  [facility], [address]    I was asked by Hien Santoro MD MPH, to evaluate Pamela Finn for bariatric surgery clearance. I have independently interviewed and examined the patient in the office and reviewed available records.     Physician HPI (6/18/2024):  44 y.o. female with mild LUIS ANTONIO. She denies any shortness of breath, unless she is doing \"fast-paced\" exercising or walking up steps. She is a lifetime non-smoker. She takes Singulair for allergies and receives monthly allergy shots. She has had multiple surgeries in the past, and states that after her hysterectomy, her postoperative course was complicated by delayed recovery from anesthesia and vomiting.  She did require a 24-hour postop stay in the hospital.  She has not been using her CPAP which was prescribed for her in 2020.    PMH:  Past Medical History:   Diagnosis Date    Allergies     seasonal    Arthritis     left knee    Migraines     Sleep apnea     has CPAP    Stomach disorder     nausea after eating, started 2023       PSH:  Past Surgical History:   Procedure Laterality Date    BREAST BIOPSY Right     benign (fibroadenoma)    CHOLECYSTECTOMY  2004    laparascopic    KNEE Left 2020    meniscus repair    OTHER SURGICAL HISTORY  04/02/2019    Laparascopic Hysterectomy    OTHER SURGICAL HISTORY Right 2009    sweat gland removal       FHx:  Family History   Problem Relation Name Age of Onset    Hypertension Mother      Alcohol abuse Father      Diabetes Father      Hypertension Father      Glaucoma Maternal Grandfather      Breast cancer Other Grandparent        Social Hx:  Social History     Socioeconomic History    Marital status:      Spouse name: Not on file    Number of children: Not on file    Years of education: Not on file    Highest education level: Not on file   Occupational History    Not on file   Tobacco Use    Smoking status: " Never    Smokeless tobacco: Never   Substance and Sexual Activity    Alcohol use: Not Currently     Comment: no alcohol in 3 yrs.    Drug use: Never    Sexual activity: Not on file   Other Topics Concern    Not on file   Social History Narrative    Not on file     Social Determinants of Health     Financial Resource Strain: Not on file   Food Insecurity: No Food Insecurity (10/26/2023)    Hunger Vital Sign     Worried About Running Out of Food in the Last Year: Never true     Ran Out of Food in the Last Year: Never true   Transportation Needs: Not on file   Physical Activity: Not on file   Stress: Not on file   Social Connections: Not on file   Intimate Partner Violence: Not on file   Housing Stability: Not on file       Immunization History:  Immunization History   Administered Date(s) Administered    DTaP, Unspecified 07/08/2015    Flu vaccine (IIV4), preservative free *Check age/dose* 10/07/2022    Flu vaccine, quadrivalent, no egg protein, age 6 month or greater (FLUCELVAX) 01/14/2020, 10/03/2021    Flu vaccine, quadrivalent, recombinant, preservative free, adult (FLUBLOK) 10/13/2020    Hepatitis B vaccine, adult *Check Product/Dose* 11/03/2020, 04/03/2021, 05/03/2021, 10/03/2021    Influenza, seasonal, injectable 10/02/2020    MMR vaccine, subcutaneous (MMR II) 10/31/2020, 04/01/2021    Moderna SARS-CoV-2 Vaccination 08/06/2021, 09/03/2021, 02/16/2022    PPD Test 04/17/2002, 11/03/2020, 10/12/2022    Tdap vaccine, age 7 year and older (BOOSTRIX, ADACEL) 10/31/2020       Current Medications:  Current Outpatient Medications   Medication Instructions    azelastine (Optivar) 0.05 % ophthalmic solution ophthalmic (eye)    buPROPion XL (WELLBUTRIN XL) 300 mg, oral, Daily    estradiol (ESTRACE) 0.5 g, vaginal, 3 times weekly, Use a pea-sized amount at the vaginal opening nightly for 2 weeks, then 3 times weekly thereafter.    fluticasone (Flonase) 50 mcg/actuation nasal spray 1 spray, Each Nostril, Daily, Shake  "gently. Before first use, prime pump. After use, clean tip and replace cap.    montelukast (SINGULAIR) 10 mg, oral, Daily    pantoprazole (PROTONIX) 40 mg, oral, Daily before breakfast, Do not crush, chew, or split.    rizatriptan (Maxalt) 10 mg tablet 1po under the tongue on onset of severe headache may repeat it again after 8 hours up to 2 a day    topiramate (TOPAMAX) 50 mg, oral, 2 times daily        Drug Allergies/Intolerances:  Allergies   Allergen Reactions    Amoxicillin Swelling     Hives and face swelling    Oxycodone Swelling     itching and all over swelling    Oxycodone-Acetaminophen Other    Penicillin G Unknown    Penicillin Rash        Review of Systems:  Review of Systems     All other review of systems are negative and/or non-contributory.    Physical Examination:  Vitals:    06/18/24 0905   BP: 128/82   BP Location: Right arm   Patient Position: Sitting   Pulse: 60   Resp: 18   Temp: 36.3 °C (97.3 °F)   TempSrc: Temporal   SpO2: 98%   Weight: 130 kg (286 lb)   Height: 1.676 m (5' 6\")        GEN: no respiratory difficulties noted  ENT: Mallampati III,   CV: RRR, no m/g/r  LUNGS: good effort, clear bilaterally, no w/r/r  ABD: Soft, nontender  EXT: no edema, cyanosis, clubbing  NEURO: strength equal bilaterally, sensation grossly intact        Pulmonary Function Test Results     None    Sleep Study   11/9/2020:  Sleep efficiency: 91.9%  AHI: 8.2  SpO2 raudel: 90%  Recommendations: CPAP titration study vs auto-PAP at 4-12 cmH2O    Exacerbation History     N/A    Chest Radiograph     2/4/2016: normal      Chest CT Scan     No results found for this or any previous visit from the past 1095 days.       Bronchoscopy     None    Labs     Lab Results   Component Value Date    WBC 5.2 10/14/2023    HGB 13.3 10/14/2023    HCT 42.3 10/14/2023    MCV 88 10/14/2023     10/14/2023     No results found for: \"BNP\"  Lab Results   Component Value Date    EOSABS 0.09 10/14/2023         Echocardiogram     No " results found for this or any previous visit from the past 365 days.       CAT and mMRC     mMRC (Modified Medical Research [place]) Dyspnea Scale  Dyspnea when hurrying or walking up a hill: +1    Reference: Ayan DA, Cristian CK. Evaluation of clinical methods for rating dyspnea. CHEST. 1988;93(3):580-6.      ASSESSMENT & PLAN     Problem List Items Addressed This Visit       Bariatric surgery status - Primary    Morbid (severe) obesity due to excess calories (Multi)    Obstructive sleep apnea syndrome    Relevant Orders    Referral to Adult Sleep Medicine        Summary:  44 y.o. female here for pulmonary pre-op clearance for bariatric surgery.    Her ARISCAT score for postoperative pulmonary complications is 16, placing her in the low (1.6%) risk category of developing respiratory failure, respiratory infection, pleural effusion, atelectasis, pneumothorax, bronchospasm, aspiration pneumonitis. STOP-BANG score is 4. Her ESS is 11.     Plan:  -Will refer to sleep medicine for reinstatement of CPAP. She has CPAP already prescribed, but is not using it. Previous AHI was 8.  -Minimize use of sedatives/hypnotics in demi-operative period  -Use CPAP, incentive spirometry and encourage early ambulation during perioperative period to prevent and manage post-operative atelectasis      Follow-up: RU Melendez DO  Staff Physician - Pulmonary & Critical Care  06/18/24 9:08 AM  Office number: [phone]   Fax number:  [phone]

## 2024-06-18 NOTE — LETTER
June 18, 2024     Hien Santoro MD MPH  [id number]  Bariatric Lab  [facility] [id number]    Patient: Pamela Finn   YOB: 1980   Date of Visit: 6/18/2024       Dear Dr. Hien Santoro MD MPH:    Thank you for referring Pamela Finn to me for evaluation. Below are my notes for this consultation.  If you have questions, please do not hesitate to call me. I look forward to following your patient along with you.       Sincerely,     Soraya Mckeon, APRN-CNP      CC: No Recipients  ______________________________________________________________________________________    Referred by Dr. 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     Has not started a consistent exercise routine. She has been walking outside at least 2-3 times per week (2 miles). When she goes to the gym (has been there twice) treadmill 30 minutes 2.7 miles/hr with an incline of 4-5%. Bicycle for 30 minutes.   Patient denies chest pain and angina.  Pt denies shortness of breath, dyspnea on exertion, orthopnea, and paroxysmal nocturnal dyspnea.  Pt denies worsening lower extremity edema.  Pt denies palpitations or syncope.  No recent falls.  No fever or chills.  No cough.  No change in bowel or bladder habits.  No sick contacts.  No recent travel.      Past Medical History:  She has a past medical history of Allergies, Arthritis, Migraines, Sleep apnea, and Stomach disorder.    Past Surgical History:  She has a past surgical history that includes Other surgical history (04/02/2019); Breast biopsy (Right); XR knee (Left, 2020); Cholecystectomy (2004); and Other surgical history (Right, 2009).      Social History:  She reports that she has never smoked. She has never  "used smokeless tobacco. She reports that she does not currently use alcohol. She reports that she does not use drugs.    Family History:  Family History   Problem Relation Name Age of Onset   • Hypertension Mother     • Alcohol abuse Father     • Diabetes Father     • Hypertension Father     • Glaucoma Maternal Grandfather     • Breast cancer Other Grandparent         Allergies:  Amoxicillin, Animal dander, House dust mite, Mold extracts, Oxycodone, Oxycodone-acetaminophen, Penicillin g, Pollen extracts, and Penicillin    Outpatient Medications:  Current Outpatient Medications   Medication Instructions   • azelastine (Optivar) 0.05 % ophthalmic solution ophthalmic (eye)   • buPROPion XL (WELLBUTRIN XL) 300 mg, oral, Daily   • estradiol (ESTRACE) 0.5 g, vaginal, 3 times weekly, Use a pea-sized amount at the vaginal opening nightly for 2 weeks, then 3 times weekly thereafter.   • fluticasone (Flonase) 50 mcg/actuation nasal spray 1 spray, Each Nostril, Daily, Shake gently. Before first use, prime pump. After use, clean tip and replace cap.   • montelukast (SINGULAIR) 10 mg, oral, Daily   • pantoprazole (PROTONIX) 40 mg, oral, Daily before breakfast, Do not crush, chew, or split.   • rizatriptan (Maxalt) 10 mg tablet 1po under the tongue on onset of severe headache may repeat it again after 8 hours up to 2 a day   • topiramate (TOPAMAX) 50 mg, oral, 2 times daily        Last Recorded Vitals:  Vitals:    06/18/24 0943   BP: 136/84   Pulse: (!) 49   SpO2: 99%   Weight: 132 kg (290 lb)   Height: 1.676 m (5' 6\")       Physical Exam  Constitutional:       Appearance: Normal appearance. She is morbidly obese.   Neck:      Vascular: No carotid bruit.   Cardiovascular:      Rate and Rhythm: Normal rate and regular rhythm.      Heart sounds: No murmur heard.  Pulmonary:      Effort: Pulmonary effort is normal.      Breath sounds: Normal breath sounds.   Abdominal:      Palpations: Abdomen is soft.   Skin:     General: Skin is " warm.   Neurological:      Mental Status: She is alert and oriented to person, place, and time.   Psychiatric:         Mood and Affect: Mood normal.          Last Labs:  CBC -  Lab Results   Component Value Date    WBC 5.2 10/14/2023    HGB 13.3 10/14/2023    HCT 42.3 10/14/2023    MCV 88 10/14/2023     10/14/2023       CMP -  Lab Results   Component Value Date    CALCIUM 9.6 10/14/2023    PROT 7.3 10/14/2023    ALBUMIN 3.9 10/14/2023    AST 14 10/14/2023    ALT 11 10/14/2023    ALKPHOS 83 10/14/2023    BILITOT 0.5 10/14/2023       LIPID PANEL -   Lab Results   Component Value Date    CHOL 177 10/14/2023    TRIG 51 10/14/2023    HDL 55.3 10/14/2023    CHHDL 3.2 10/14/2023    LDLF 93 10/14/2022    VLDL 10 10/14/2023    NHDL 122 10/14/2023       RENAL FUNCTION PANEL -   Lab Results   Component Value Date    GLUCOSE 91 10/14/2023     10/14/2023    K 4.5 10/14/2023     10/14/2023    CO2 27 10/14/2023    ANIONGAP 13 10/14/2023    BUN 11 10/14/2023    CREATININE 0.78 10/14/2023    CALCIUM 9.6 10/14/2023    ALBUMIN 3.9 10/14/2023        Lab Results   Component Value Date    HGBA1C 5.4 10/14/2023       Last Cardiology Tests:  ECG:  Sinus Bradycardia 49 bpm    Assessment/Plan    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     Has not started a consistent exercise routine. She has been walking outside at least 2-3 times per week (2 miles). When she goes to the gym (has been there twice) treadmill 30 minutes 2.7 miles/hr with an incline of 4-5%. Bicycle for 30 minutes. Her functional capacity is > 4 METS and she does not have any cardiac concerns or complaints. EKG today Sinus Bradycardia at 49 bpm. She is asymptomatic. Most recent TSH was done 6 months ago and was normal. However, she she has a previous order for lab panel which includes TSH  with reflexive T4 . She was reminded to obtain. No further cardiac testing is needed at this time. She is a low cardiac risk for gastric sleeve.    Will follow up in 1 week to review TSH    MORENA Mcrae-CNP

## 2024-06-18 NOTE — PATIENT INSTRUCTIONS
Pamela,    Virtual Visit in 1 week    Lab work to test thyroid function given low HR.    Soraya BERG-CNP

## 2024-06-21 ENCOUNTER — LAB (OUTPATIENT)
Dept: LAB | Facility: LAB | Age: 44
End: 2024-06-21
Payer: COMMERCIAL

## 2024-06-21 DIAGNOSIS — I10 BENIGN ESSENTIAL HYPERTENSION: ICD-10-CM

## 2024-06-21 DIAGNOSIS — K21.9 GASTROESOPHAGEAL REFLUX DISEASE, UNSPECIFIED WHETHER ESOPHAGITIS PRESENT: ICD-10-CM

## 2024-06-21 DIAGNOSIS — Z98.84 BARIATRIC SURGERY STATUS: ICD-10-CM

## 2024-06-21 DIAGNOSIS — M25.562 PAIN IN BOTH KNEES, UNSPECIFIED CHRONICITY: ICD-10-CM

## 2024-06-21 DIAGNOSIS — M25.561 PAIN IN BOTH KNEES, UNSPECIFIED CHRONICITY: ICD-10-CM

## 2024-06-21 DIAGNOSIS — G47.33 OBSTRUCTIVE SLEEP APNEA SYNDROME: ICD-10-CM

## 2024-06-21 DIAGNOSIS — E88.810 DYSMETABOLIC SYNDROME: ICD-10-CM

## 2024-06-21 DIAGNOSIS — E66.01 MORBID (SEVERE) OBESITY DUE TO EXCESS CALORIES (MULTI): ICD-10-CM

## 2024-06-21 LAB
25(OH)D3 SERPL-MCNC: 26 NG/ML (ref 30–100)
ALBUMIN SERPL BCP-MCNC: 4 G/DL (ref 3.4–5)
ALP SERPL-CCNC: 99 U/L (ref 33–110)
ALT SERPL W P-5'-P-CCNC: 11 U/L (ref 7–45)
AMPHETAMINES UR QL SCN: NORMAL
ANION GAP SERPL CALC-SCNC: 14 MMOL/L (ref 10–20)
APTT PPP: 30 SECONDS (ref 27–38)
AST SERPL W P-5'-P-CCNC: 12 U/L (ref 9–39)
ATRIAL RATE: 49 BPM
BARBITURATES UR QL SCN: NORMAL
BASOPHILS # BLD AUTO: 0.04 X10*3/UL (ref 0–0.1)
BASOPHILS NFR BLD AUTO: 0.8 %
BENZODIAZ UR QL SCN: NORMAL
BILIRUB SERPL-MCNC: 0.5 MG/DL (ref 0–1.2)
BUN SERPL-MCNC: 12 MG/DL (ref 6–23)
BZE UR QL SCN: NORMAL
CALCIUM SERPL-MCNC: 9.2 MG/DL (ref 8.6–10.6)
CANNABINOIDS UR QL SCN: NORMAL
CHLORIDE SERPL-SCNC: 105 MMOL/L (ref 98–107)
CHOLEST SERPL-MCNC: 176 MG/DL (ref 0–199)
CHOLESTEROL/HDL RATIO: 2.9
CO2 SERPL-SCNC: 25 MMOL/L (ref 21–32)
CREAT SERPL-MCNC: 0.83 MG/DL (ref 0.5–1.05)
EGFRCR SERPLBLD CKD-EPI 2021: 90 ML/MIN/1.73M*2
EOSINOPHIL # BLD AUTO: 0.05 X10*3/UL (ref 0–0.7)
EOSINOPHIL NFR BLD AUTO: 1 %
ERYTHROCYTE [DISTWIDTH] IN BLOOD BY AUTOMATED COUNT: 14.4 % (ref 11.5–14.5)
EST. AVERAGE GLUCOSE BLD GHB EST-MCNC: 105 MG/DL
FENTANYL+NORFENTANYL UR QL SCN: NORMAL
FERRITIN SERPL-MCNC: 94 NG/ML (ref 8–150)
FOLATE SERPL-MCNC: 8.1 NG/ML
GLUCOSE SERPL-MCNC: 77 MG/DL (ref 74–99)
HBA1C MFR BLD: 5.3 %
HCT VFR BLD AUTO: 42.5 % (ref 36–46)
HDLC SERPL-MCNC: 60.6 MG/DL
HGB BLD-MCNC: 13.7 G/DL (ref 12–16)
IMM GRANULOCYTES # BLD AUTO: 0.01 X10*3/UL (ref 0–0.7)
IMM GRANULOCYTES NFR BLD AUTO: 0.2 % (ref 0–0.9)
INR PPP: 1 (ref 0.9–1.1)
IRON SATN MFR SERPL: 21 % (ref 25–45)
IRON SERPL-MCNC: 70 UG/DL (ref 35–150)
LDLC SERPL CALC-MCNC: 105 MG/DL
LYMPHOCYTES # BLD AUTO: 1.82 X10*3/UL (ref 1.2–4.8)
LYMPHOCYTES NFR BLD AUTO: 34.8 %
MCH RBC QN AUTO: 27.6 PG (ref 26–34)
MCHC RBC AUTO-ENTMCNC: 32.2 G/DL (ref 32–36)
MCV RBC AUTO: 86 FL (ref 80–100)
METHADONE UR QL SCN: NORMAL
MONOCYTES # BLD AUTO: 0.37 X10*3/UL (ref 0.1–1)
MONOCYTES NFR BLD AUTO: 7.1 %
NEUTROPHILS # BLD AUTO: 2.94 X10*3/UL (ref 1.2–7.7)
NEUTROPHILS NFR BLD AUTO: 56.1 %
NON HDL CHOLESTEROL: 115 MG/DL (ref 0–149)
NRBC BLD-RTO: 0 /100 WBCS (ref 0–0)
OPIATES UR QL SCN: NORMAL
OXYCODONE+OXYMORPHONE UR QL SCN: NORMAL
P AXIS: 59 DEGREES
P OFFSET: 185 MS
P ONSET: 136 MS
PCP UR QL SCN: NORMAL
PLATELET # BLD AUTO: 439 X10*3/UL (ref 150–450)
POTASSIUM SERPL-SCNC: 4 MMOL/L (ref 3.5–5.3)
PR INTERVAL: 168 MS
PROT SERPL-MCNC: 7.3 G/DL (ref 6.4–8.2)
PROTHROMBIN TIME: 11 SECONDS (ref 9.8–12.8)
PTH-INTACT SERPL-MCNC: 86.4 PG/ML (ref 18.5–88)
Q ONSET: 220 MS
QRS COUNT: 8 BEATS
QRS DURATION: 86 MS
QT INTERVAL: 424 MS
QTC CALCULATION(BAZETT): 383 MS
QTC FREDERICIA: 396 MS
R AXIS: 54 DEGREES
RBC # BLD AUTO: 4.96 X10*6/UL (ref 4–5.2)
SODIUM SERPL-SCNC: 140 MMOL/L (ref 136–145)
T AXIS: 28 DEGREES
T OFFSET: 432 MS
T4 FREE SERPL-MCNC: 1.01 NG/DL (ref 0.78–1.48)
TIBC SERPL-MCNC: 328 UG/DL (ref 240–445)
TRIGL SERPL-MCNC: 51 MG/DL (ref 0–149)
TSH SERPL-ACNC: 2.38 MIU/L (ref 0.44–3.98)
UIBC SERPL-MCNC: 258 UG/DL (ref 110–370)
VENTRICULAR RATE: 49 BPM
VIT B12 SERPL-MCNC: 858 PG/ML (ref 211–911)
VLDL: 10 MG/DL (ref 0–40)
WBC # BLD AUTO: 5.2 X10*3/UL (ref 4.4–11.3)

## 2024-06-21 PROCEDURE — 80323 ALKALOIDS NOS: CPT

## 2024-06-21 PROCEDURE — 83013 H PYLORI (C-13) BREATH: CPT

## 2024-06-21 PROCEDURE — 80307 DRUG TEST PRSMV CHEM ANLYZR: CPT

## 2024-06-21 PROCEDURE — 36415 COLL VENOUS BLD VENIPUNCTURE: CPT

## 2024-06-21 PROCEDURE — 82746 ASSAY OF FOLIC ACID SERUM: CPT

## 2024-06-21 PROCEDURE — 82607 VITAMIN B-12: CPT

## 2024-06-21 PROCEDURE — 83540 ASSAY OF IRON: CPT

## 2024-06-21 PROCEDURE — 84443 ASSAY THYROID STIM HORMONE: CPT

## 2024-06-21 PROCEDURE — 80053 COMPREHEN METABOLIC PANEL: CPT

## 2024-06-21 PROCEDURE — 85730 THROMBOPLASTIN TIME PARTIAL: CPT

## 2024-06-21 PROCEDURE — 83036 HEMOGLOBIN GLYCOSYLATED A1C: CPT

## 2024-06-21 PROCEDURE — 84630 ASSAY OF ZINC: CPT

## 2024-06-21 PROCEDURE — 83550 IRON BINDING TEST: CPT

## 2024-06-21 PROCEDURE — 82306 VITAMIN D 25 HYDROXY: CPT

## 2024-06-21 PROCEDURE — 85025 COMPLETE CBC W/AUTO DIFF WBC: CPT

## 2024-06-21 PROCEDURE — 84425 ASSAY OF VITAMIN B-1: CPT

## 2024-06-21 PROCEDURE — 83970 ASSAY OF PARATHORMONE: CPT

## 2024-06-21 PROCEDURE — 80061 LIPID PANEL: CPT

## 2024-06-21 PROCEDURE — 84439 ASSAY OF FREE THYROXINE: CPT

## 2024-06-21 PROCEDURE — 85610 PROTHROMBIN TIME: CPT

## 2024-06-21 PROCEDURE — 82728 ASSAY OF FERRITIN: CPT

## 2024-06-21 PROCEDURE — 82525 ASSAY OF COPPER: CPT

## 2024-06-22 LAB — UREA BREATH TEST QL: NEGATIVE

## 2024-06-24 LAB
COPPER SERPL-MCNC: 113.8 UG/DL (ref 80–155)
COTININE SERPL-MCNC: <5 NG/ML
NICOTINE SERPL-MCNC: <5 NG/ML
ZINC SERPL-MCNC: 70.2 UG/DL (ref 60–120)

## 2024-06-26 LAB — VIT B1 PYROPHOSHATE BLD-SCNC: 101 NMOL/L (ref 70–180)

## 2024-06-27 ENCOUNTER — DOCUMENTATION (OUTPATIENT)
Dept: SURGERY | Facility: HOSPITAL | Age: 44
End: 2024-06-27
Payer: COMMERCIAL

## 2024-06-28 ENCOUNTER — TELEMEDICINE (OUTPATIENT)
Dept: CARDIOLOGY | Facility: CLINIC | Age: 44
End: 2024-06-28
Payer: COMMERCIAL

## 2024-06-28 DIAGNOSIS — Z01.818 PRE-OP TESTING: Primary | ICD-10-CM

## 2024-06-28 PROCEDURE — 99212 OFFICE O/P EST SF 10 MIN: CPT

## 2024-06-28 PROCEDURE — 3008F BODY MASS INDEX DOCD: CPT

## 2024-06-28 NOTE — PROGRESS NOTES
Chief Complaint:   Lab work: Bariatric Status     History Of Present Illness:    Expand All Collapse All    Referred by Dr. 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       She reports to doing well. We discussed lab work. She reports that she has increased her activity and is walking more daily. Patient denies chest pain and angina.  Pt denies shortness of breath, dyspnea on exertion, orthopnea, and paroxysmal nocturnal dyspnea.  Pt denies worsening lower extremity edema.  Pt denies palpitations or syncope.  No recent falls.  No fever or chills.  No cough.  No change in bowel or bladder habits.  No sick contacts.  No recent travel.    Review of Systems   All other systems reviewed and are negative.    Past Medical History:  She has a past medical history of Allergies, Arthritis, Migraines, Sleep apnea, and Stomach disorder.    Past Surgical History:  She has a past surgical history that includes Other surgical history (04/02/2019); Breast biopsy (Right); XR knee (Left, 2020); Cholecystectomy (2004); and Other surgical history (Right, 2009).      Social History:  She reports that she has never smoked. She has never used smokeless tobacco. She reports that she does not currently use alcohol. She reports that she does not use drugs.    Family History:  Family History   Problem Relation Name Age of Onset    Hypertension Mother      Alcohol abuse Father      Diabetes Father      Hypertension Father      Glaucoma Maternal Grandfather      Breast cancer Other Grandparent         Allergies:  Amoxicillin, Animal dander, House dust mite, Mold extracts, Oxycodone, Oxycodone-acetaminophen, Penicillin g, Pollen extracts, and Penicillin    Outpatient  Medications:  Current Outpatient Medications   Medication Instructions    azelastine (Optivar) 0.05 % ophthalmic solution ophthalmic (eye)    buPROPion XL (WELLBUTRIN XL) 300 mg, oral, Daily    estradiol (ESTRACE) 0.5 g, vaginal, 3 times weekly, Use a pea-sized amount at the vaginal opening nightly for 2 weeks, then 3 times weekly thereafter.    fluticasone (Flonase) 50 mcg/actuation nasal spray 1 spray, Each Nostril, Daily, Shake gently. Before first use, prime pump. After use, clean tip and replace cap.    montelukast (SINGULAIR) 10 mg, oral, Daily    pantoprazole (PROTONIX) 40 mg, oral, Daily before breakfast, Do not crush, chew, or split.    rizatriptan (Maxalt) 10 mg tablet 1po under the tongue on onset of severe headache may repeat it again after 8 hours up to 2 a day    topiramate (TOPAMAX) 50 mg, oral, 2 times daily     Physical examination performed via telemedicine encounter:  General: awake, alert, non-diaphoretic, no psychomotor agitation, no acute distress.  Head: atraumatic, normocephalic, no rashes or lesions noted.  Eyes: no redness, discharge, swelling, or lesions.  Nose: no redness, swelling, discharge, deformity, or impetigo/crusting.  Skin: no lesions, wounds, erythema, or cyanosis noted on face or hands.  Cardiopulmonary: no increased respiratory effort, speaking in clear sentences, inspiratory to expiratory ratio within normal limits.  Musculoskeletal: normal range of motion of neck, upper extremities, and hands with no obvious synovitis.  Neurologic: cranial nerves grossly normal, speech normal rate and rhythm, moved both upper extremities equally.  Psychiatric: normal appearance, normal behavior, and normal attitude; well groomed, pleasant, cooperative.        Last Labs:  CBC -  Lab Results   Component Value Date    WBC 5.2 06/21/2024    HGB 13.7 06/21/2024    HCT 42.5 06/21/2024    MCV 86 06/21/2024     06/21/2024       CMP -  Lab Results   Component Value Date    CALCIUM 9.2  06/21/2024    PROT 7.3 06/21/2024    ALBUMIN 4.0 06/21/2024    AST 12 06/21/2024    ALT 11 06/21/2024    ALKPHOS 99 06/21/2024    BILITOT 0.5 06/21/2024       LIPID PANEL -   Lab Results   Component Value Date    CHOL 176 06/21/2024    TRIG 51 06/21/2024    HDL 60.6 06/21/2024    CHHDL 2.9 06/21/2024    LDLF 93 10/14/2022    VLDL 10 06/21/2024    NHDL 115 06/21/2024       RENAL FUNCTION PANEL -   Lab Results   Component Value Date    GLUCOSE 77 06/21/2024     06/21/2024    K 4.0 06/21/2024     06/21/2024    CO2 25 06/21/2024    ANIONGAP 14 06/21/2024    BUN 12 06/21/2024    CREATININE 0.83 06/21/2024    CALCIUM 9.2 06/21/2024    ALBUMIN 4.0 06/21/2024        Lab Results   Component Value Date    HGBA1C 5.3 06/21/2024       Last Cardiology Tests:  ECG:  ECG 12 lead (Clinic Performed) 06/18/2024  Sinus Bradycardia 49 bpm    Assessment/Plan   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       She reports to doing well. We discussed lab work. She reports that she has increased her activity and is walking more daily. Given NSB (HR at 47) wanted to make sure she did not have hypothyroidism. TSH level normal . She denies cardiac complaints. No further testing is needed at this time. Her functional capacity is > 4 METS (walking outside at least 2-3 times per week (2 miles). When she goes to the gym (has been there twice) treadmill 30 minutes 2.7 miles/hr with an incline of 4-5%. Bicycle for 30 minutes. )  As per previous visit she is a low cardiac risk for an intermediate risk procedure.       Soraya Mckeon, APRN-CNP

## 2024-07-26 ENCOUNTER — DOCUMENTATION (OUTPATIENT)
Dept: SURGERY | Facility: CLINIC | Age: 44
End: 2024-07-26
Payer: COMMERCIAL

## 2024-08-15 ENCOUNTER — APPOINTMENT (OUTPATIENT)
Dept: ORTHOPEDIC SURGERY | Facility: CLINIC | Age: 44
End: 2024-08-15
Payer: COMMERCIAL

## 2024-08-29 ENCOUNTER — OFFICE VISIT (OUTPATIENT)
Dept: ORTHOPEDIC SURGERY | Facility: CLINIC | Age: 44
End: 2024-08-29
Payer: COMMERCIAL

## 2024-08-29 DIAGNOSIS — M17.12 PRIMARY OSTEOARTHRITIS OF LEFT KNEE: Primary | ICD-10-CM

## 2024-08-29 PROCEDURE — 99214 OFFICE O/P EST MOD 30 MIN: CPT | Performed by: PHYSICIAN ASSISTANT

## 2024-08-29 PROCEDURE — 2500000005 HC RX 250 GENERAL PHARMACY W/O HCPCS: Performed by: PHYSICIAN ASSISTANT

## 2024-08-29 PROCEDURE — 2500000004 HC RX 250 GENERAL PHARMACY W/ HCPCS (ALT 636 FOR OP/ED): Performed by: PHYSICIAN ASSISTANT

## 2024-08-29 PROCEDURE — 20610 DRAIN/INJ JOINT/BURSA W/O US: CPT | Mod: LT | Performed by: PHYSICIAN ASSISTANT

## 2024-08-29 PROCEDURE — 1036F TOBACCO NON-USER: CPT | Performed by: PHYSICIAN ASSISTANT

## 2024-08-29 RX ORDER — TRIAMCINOLONE ACETONIDE 40 MG/ML
1 INJECTION, SUSPENSION INTRA-ARTICULAR; INTRAMUSCULAR
Status: COMPLETED | OUTPATIENT
Start: 2024-08-29 | End: 2024-08-29

## 2024-08-29 RX ORDER — LIDOCAINE HYDROCHLORIDE 10 MG/ML
5 INJECTION INFILTRATION; PERINEURAL
Status: COMPLETED | OUTPATIENT
Start: 2024-08-29 | End: 2024-08-29

## 2024-08-29 RX ORDER — MELOXICAM 15 MG/1
15 TABLET ORAL DAILY
Qty: 30 TABLET | Refills: 1 | Status: SHIPPED | OUTPATIENT
Start: 2024-08-29 | End: 2024-09-28

## 2024-08-29 ASSESSMENT — PAIN - FUNCTIONAL ASSESSMENT: PAIN_FUNCTIONAL_ASSESSMENT: NO/DENIES PAIN

## 2024-08-29 NOTE — PROGRESS NOTES
HPI  Patient is a 44 y.o. female with medical history significant for   Patient Active Problem List   Diagnosis    Abnormal screening mammogram    Abnormal vaginal bleeding    Acne    Skin lesion    Allergic rhinitis    Allergic rhinitis due to allergen    Arthritis of knee, left    Astigmatism    Hyperopia    Bariatric surgery status    Benign essential hypertension    Breast mass, left    Breast mass, right    Lump, breast    Bronchitis with bronchospasm    Chalazion    Chalazion of right upper eyelid    Chondromalacia    Chronic headaches    Dysmetabolic syndrome    Early satiety    Murmur, cardiac    GERD (gastroesophageal reflux disease)    Head ache    Increased frequency of urination    Urinary frequency    Insomnia    Irregular menses    Knee pain, bilateral    Knee pain    Localized osteoarthritis of knees, bilateral    Nausea and vomiting    Obstructive sleep apnea syndrome    Onychomycosis of toenail    Polyphagia    Presbyopia    Severe menstrual cramps    Shortness of breath    Sore throat    Stye external    Suspected sleep apnea    Tear of lateral meniscus of left knee    Viral infection    Morbid (severe) obesity due to excess calories (Multi)    Seasonal allergic rhinitis    BMI 45.0-49.9, adult (Multi)    Morbid obesity (Multi)    Bradycardia    Encounter for pre-operative cardiovascular clearance    Pre-op testing     She presents today for follow-up for bilateral knee osteoarthritis and for repeat corticosteroid injection in her left knee only. Patient states that previous injections have provided her with good relief although her last injection did not last as long as it has in the past. She works as a  for [employer] system and has been off all summer, so the knee is not aching as much as it usually would be. She is asking about the gel injections, as she had one in the past, prior to seeing me and it did provide her with good relief for more than 6 months.  She denies recent  injury or trauma to the left knee, denies fever/chills, N/T or calf pain.     ROS: All other systems have been reviewed and are negative except as previously noted in history of present illness.    Gen: The patient is alert and oriented ×3, is in no acute distress, and appear their stated age and weight.  Psychiatric: Mood and affect are appropriate.  Eyes: Sclera are white, and pupils are round and symmetric.  ENT: Mucous membranes are moist.   Neck: Supple. Thyroid is midline.  Respiratory: Respirations are nonlabored, chest rise is symmetric.  Cardiac: Rate is regular by palpation of distal pulses.   Abdomen: Nondistended.  Integument: No obvious cutaneous lesions are noted. No signs of lymphangitis. No signs of systemic edema.    Musculoskeletal: LLE  skin intact, no wounds or breakdown noted  mild lower leg edema  TTP joint line medial and patellofemoral   no knee effusion noted  compartments soft  no calf tenderness  sensation intact to light touch  motor intact including TA/GS/EHL  palpable DP/PT pulses 2+  stable to valgus/varus stress exams at 30 degrees of flexion  knee motion: 0-120 degrees     XR: Prior images of left knee reviewed personally by me today and reveal mild arthritis with joint space narrowing noted in patellofemoral compartment, osteophyte formation    IMP:  Problem List Items Addressed This Visit       Primary osteoarthritis of left knee - Primary    Relevant Medications    meloxicam (Mobic) 15 mg tablet            DISCUSSION: I discussed the conservative treatment options for osteoarthritis including physical therapy, NSAIDs and corticosteroid injection and briefly discussed indications for surgery. Patient should try to delay joint replacement surgery for as long as possible. If the patient's joint problem affects their quality of life and cause significant restriction of their activities, they may want to consider joint replacement surgery. I reviewed the importance for adopting a low  impact lifestyle and avoidance of joint overloading activities. I explained the risks and benefits of the injection.  Patient verbalized understanding and wishes to proceed with cortisone injection for the left knee again today.     Patient ID: Pamela Finn is a 44 y.o. female.    L Inj/Asp: L knee on 8/29/2024 10:54 AM  Indications: pain  Details: 21 G needle, anterolateral approach  Medications: 5 mL lidocaine 10 mg/mL (1 %); 1 mL triamcinolone acetonide 40 mg/mL  Outcome: tolerated well, no immediate complications  Consent was given by the patient. Immediately prior to procedure a time out was called to verify the correct patient, procedure, equipment, support staff and site/side marked as required. Patient was prepped and draped in the usual sterile fashion.           PLAN:  Patient should observe for signs of infection and/or adverse reactions (redness, significant increase in pain, fever, nausea or vomiting) after receiving an injection today. If you develop any of the above symptoms, please contact my office. Patient should see the maximum effect in 3 to 5 days and can receive another cortisone injection no sooner than 3 months from today. Patient will continue with activities as tolerated. Mobilize to prevent stiffness. I have discussed viscosupplementation injections with her for the left knee as she is a good candidate for these and we will begin the authorization process with her insurance for the visco injection for the left knee only. I have e-scribed meloxicam to her pharmacy today, take as directed.   Follow up in 3 months for the gel injection, no x-rays needed. All questions were answered.

## 2024-09-09 ENCOUNTER — DOCUMENTATION (OUTPATIENT)
Dept: SURGERY | Facility: HOSPITAL | Age: 44
End: 2024-09-09
Payer: COMMERCIAL

## 2024-10-30 ENCOUNTER — OFFICE VISIT (OUTPATIENT)
Dept: SLEEP MEDICINE | Facility: CLINIC | Age: 44
End: 2024-10-30
Payer: COMMERCIAL

## 2024-10-30 DIAGNOSIS — E66.01 MORBID OBESITY (MULTI): ICD-10-CM

## 2024-10-30 DIAGNOSIS — G47.33 OSA ON CPAP: Primary | ICD-10-CM

## 2024-10-30 DIAGNOSIS — Z78.9 INTOLERANCE OF CONTINUOUS POSITIVE AIRWAY PRESSURE (CPAP) VENTILATION: ICD-10-CM

## 2024-10-30 PROCEDURE — 99204 OFFICE O/P NEW MOD 45 MIN: CPT | Performed by: INTERNAL MEDICINE

## 2024-10-30 PROCEDURE — 99214 OFFICE O/P EST MOD 30 MIN: CPT | Mod: 95 | Performed by: INTERNAL MEDICINE

## 2024-10-30 RX ORDER — SERTRALINE HYDROCHLORIDE 25 MG/1
25 TABLET, FILM COATED ORAL DAILY
COMMUNITY

## 2024-10-30 ASSESSMENT — SLEEP AND FATIGUE QUESTIONNAIRES
SLEEP_PROBLEM_NOTICEABLE_TO_OTHERS: VERY MUCH NOTICEABLE
HOW LIKELY ARE YOU TO NOD OFF OR FALL ASLEEP WHEN YOU ARE A PASSENGER IN A CAR FOR AN HOUR WITHOUT A BREAK: HIGH CHANCE OF DOZING
HOW LIKELY ARE YOU TO NOD OFF OR FALL ASLEEP WHILE SITTING AND READING: MODERATE CHANCE OF DOZING
HOW LIKELY ARE YOU TO NOD OFF OR FALL ASLEEP WHILE WATCHING TV: MODERATE CHANCE OF DOZING
HOW LIKELY ARE YOU TO NOD OFF OR FALL ASLEEP WHILE LYING DOWN TO REST IN THE AFTERNOON WHEN CIRCUMSTANCES PERMIT: HIGH CHANCE OF DOZING
HOW LIKELY ARE YOU TO NOD OFF OR FALL ASLEEP WHILE SITTING AND TALKING TO SOMEONE: WOULD NEVER DOZE
SLEEP_PROBLEM_INTERFERES_DAILY_ACTIVITIES: VERY MUCH NOTICEABLE
HOW LIKELY ARE YOU TO NOD OFF OR FALL ASLEEP IN A CAR, WHILE STOPPED FOR A FEW MINUTES IN TRAFFIC: WOULD NEVER DOZE
SITING INACTIVE IN A PUBLIC PLACE LIKE A CLASS ROOM OR A MOVIE THEATER: WOULD NEVER DOZE
WAKING_TOO_EARLY: VERY SEVERE
WORRIED_DISTRESSED_DUE_TO_SLEEP: VERY MUCH NOTICEABLE
ESS-CHAD TOTAL SCORE: 10
DIFFICULTY_STAYING_ASLEEP: VERY SEVERE
HOW LIKELY ARE YOU TO NOD OFF OR FALL ASLEEP WHILE SITTING QUIETLY AFTER LUNCH WITHOUT ALCOHOL: WOULD NEVER DOZE
SATISFACTION_WITH_CURRENT_SLEEP_PATTERN: VERY DISSATISFIED

## 2024-10-30 ASSESSMENT — PATIENT HEALTH QUESTIONNAIRE - PHQ9
1. LITTLE INTEREST OR PLEASURE IN DOING THINGS: NOT AT ALL
2. FEELING DOWN, DEPRESSED OR HOPELESS: NOT AT ALL
SUM OF ALL RESPONSES TO PHQ9 QUESTIONS 1 AND 2: 0

## 2024-10-30 ASSESSMENT — PAIN SCALES - GENERAL: PAINLEVEL_OUTOF10: 0-NO PAIN

## 2024-10-30 NOTE — PROGRESS NOTES
[facility] SLEEP MEDICINE   NEW CONSULT VISIT NOTE    Virtual or Telephone Consent  A telephone visit (audio only) between the patient (at the originating site) and the provider (at the distant site) was utilized to provide this telehealth service.   The patient was informed about the telehealth clinical encounter including benefits to avoiding travel, limitations of the assessment, and billing for the service. In-office care may be recommended if needed. Telehealth sessions are not being recorded and personal health information is protected. All questions were answered and verbal consent from the patient (or guardian) was obtained to proceed.     PCP: Nikki Mclaughlin MD  Referred by: Lázaro Melendez DO    HISTORY OF PRESENT ILLNESS     Patient ID: Pamela Finn is a 44 y.o. female who presents to [facility] for a comprehensive sleep medicine evaluation with concerns of sleep apnea with CPAP intolerance.    Patient is here alone today.  To review, patient's medical history is notable for morbid obesity (BMI 47), HTN, allergic rhinitis, GERD, and LUIS ANTONIO       Patient was diagnosed with LUIS ANTONIO in 2020 by PSG and was using CPAP and Resmed Airfit N20 nasal mask intermittently for 3 years then stopped due to pressure intolerance. Patient reports sensation of too much air.    SLEEP STUDY HISTORY (personally reviewed raw data such as interpretation report, data sheet, hypnogram, and titration table if available and applicable)  PSG 11/9/2020: AHI 8.2, REM AHI 15.9, supine AHI 6.4, SpO2 raudle 90%    SLEEP-WAKE SCHEDULE  Bedtime:  11 PM to 11:30 PM daily  Subjective sleep latency: 20-30 minutes  Number of awakenings:  4 times per night spontaneously for unknown reasons  Nocturia: Yes  Falls back asleep right away, sometimes 20-30 minutes  Final wake time:  5 AM to 5:30 AM on weekdays, 7 AM to 7:30 AM on weekends  Out of bed time:  5 AM to 5:30 AM on weekdays, 7 AM to 7:30 AM on  weekends  Shift work: No  Naps: 2x per week for 3 hours  Feels rested after a nap: Yes  Average sleep duration (excluding naps): 4-5 hours     SLEEP ENVIRONMENT  Sleep location: bed  Sleep status: sleeps with   Preferred sleep position: side  TV in bedroom: Yes  Room is dark: Yes  Room is quiet: Yes  Room is cool: Yes, fan is on    SLEEP HABITS  Smoking: no  ETOH: no  Marijuana: no  Caffeine: no  Sleep aids: no    SLEEP ROS  Night symptoms: POSITIVE for snoring, nasal congestion , mouth breathing, night sweats during sleep, waking up with racing heart, and nocturia and NEGATIVE for witnessed apnea, wake up gasping and/or choking for air, heartburn or sour taste in mouth at night, and nocturnal cough  Morning symptoms: POSITIVE for unrefreshing sleep, morning headache, morning dry mouth, and morning sore throat  Daytime symptoms POSITIVE for excessive daytime sleepiness, fatigue, trouble remembering things in daytime, trouble staying focused in daytime, irritability in daytime, and drowsy driving and NEGATIVE for history of car accident due to drowsy driving and history of near-miss car accident due to drowsy driving  Hypersomnia / narcolepsy symptoms: Patient denies symptoms of a hypersomnolence disorder such as sleep paralysis, sleep-related hallucinations, and cataplexy.   Parasomnia symptoms: Patient denies symptoms of parasomnia such as sleepwalking, sleeptalking, sleep-eating, acting out dreams, and nightmares.   Movements in sleep: POSITIVE for frequent leg kicks / jerks at night while asleep and NEGATIVE for seizures during sleep and sleep-related bruxism    RLS screen: Patient admits having urge to move legs that occurs at rest (sitting, getting into bed, or lying n bed), worse in the evening, and relieved temporarily with movement.   Frequency of RLS symptoms: 3-4 times per week  RLS symptoms occurring in daytime: No   RLS symptoms progressing to arms: No   RLS symptoms affect sleep onset: Yes   RLS  symptoms wakes patient up from sleep: Yes   Current or past treatment for RLS: no  Precluding events of RLS symptoms: on sertraline    WEIGHT: stable    Claustrophobia: No    REVIEW OF SYSTEMS     All other systems have been reviewed and are negative.    ALLERGIES     Allergies   Allergen Reactions    Amoxicillin Swelling     Hives and face swelling    Animal Dander Runny nose    House Dust Mite Runny nose    Mold Extracts Runny nose    Oxycodone Swelling     itching and all over swelling    Oxycodone-Acetaminophen Other    Penicillin G Unknown    Pollen Extracts Runny nose    Penicillin Rash       MEDICATIONS     Current Outpatient Medications   Medication Sig Dispense Refill    azelastine (Optivar) 0.05 % ophthalmic solution Administer into affected eye(s).      buPROPion XL (Wellbutrin XL) 300 mg 24 hr tablet Take 1 tablet (300 mg) by mouth once daily. 90 tablet 3    estradiol (Estrace) 0.01 % (0.1 mg/gram) vaginal cream Insert 0.125 Applicatorfuls (0.5 g) into the vagina 3 times a week. Use a pea-sized amount at the vaginal opening nightly for 2 weeks, then 3 times weekly thereafter. 42.5 g 3    fluticasone (Flonase) 50 mcg/actuation nasal spray ADMINISTER 1 SPRAY INTO EACH NOSTRIL ONCE DAILY. SHAKE GENTLY. BEFORE FIRST USE, PRIME PUMP. AFTER USE, CLEAN TIP AND REPLACE CAP. 48 mL 4    montelukast (Singulair) 10 mg tablet Take 1 tablet (10 mg) by mouth once daily. 90 tablet 3    rizatriptan (Maxalt) 10 mg tablet 1po under the tongue on onset of severe headache may repeat it again after 8 hours up to 2 a day      sertraline (Zoloft) 25 mg tablet Take 1 tablet (25 mg) by mouth once daily.      topiramate (Topamax) 25 mg tablet TAKE 2 TABLETS BY MOUTH TWICE A  tablet 1    pantoprazole (ProtoNix) 40 mg EC tablet Take 1 tablet (40 mg) by mouth once daily in the morning. Take before meals. Do not crush, chew, or split. 90 tablet 3     No current facility-administered medications for this visit.       PAST  HISTORIES     PERTINENT PAST MEDICAL HISTORY: See HPI    PERTINENT PAST SURGICAL HISTORY for Sleep Medicine:  non-contributory    PERTINENT FAMILY HISTORY for Sleep Medicine:  sleep apnea- mother    PERTINENT SOCIAL HISTORY:  She  reports that she has never smoked. She has never used smokeless tobacco. She reports that she does not currently use alcohol. She reports that she does not use drugs. She currently lives with spouse and employed as  in school (only day shift)    Active Problems, Allergy List, Medication List, and PMH/PSH/FH/Social Hx have been reviewed and reconciled in chart. No significant changes unless documented in the pertinent chart section. Updates made when necessary.     PHYSICAL EXAM     VITAL SIGNS: There were no vitals taken for this visit.    NECK CIRCUMFERENCE: not measured    ESS: 10  RENEE: 24      RESULTS/DATA     Iron (ug/dL)   Date Value   06/21/2024 70     % Saturation (%)   Date Value   06/21/2024 21 (L)     TIBC (ug/dL)   Date Value   06/21/2024 328     Ferritin (ng/mL)   Date Value   06/21/2024 94       Bicarbonate   Date Value Ref Range Status   06/21/2024 25 21 - 32 mmol/L Final         ASSESSMENT/PLAN     Pamela Finn is a 44 y.o. female who is seen today in [facility] for the following problems:.     OBSTRUCTIVE SLEEP APNEA, MILD (PSG AHI 8.6)  - Now on auto-CPAP of unknown settings. No available PAP adherence data   - Due to pressure intolerance, recommend changing settings to auto-CPAP 8-16 cm H2O and EPR 3. Order sent to [employer]. Advised patient to continue using machine every night all night and if napping more than 30 minutes.  - Due to history of CPAP intolerance and recent significant weight gain, recommend split night PSG (split if AHI>10) with CO2 monitoring to reevaluate for LUIS ANTONIO.  - Once split criteria has been met, start BPAP at EPAP 6 cm H2O and PS 4 cm H2O. May add O2 or backup rate if needed per  protocol.  - Discussed sleep apnea in detail to include pathophysiology, risk factors, diagnostic options, treatment options, and cardiovascular / neurologic consequences of untreated LUIS ANTONIO.   - Supportive management as follows: Lose weight. Stay off your back when sleeping. Avoid smoking, alcohol, and sedating medications if applicable. Don't drive when sleepy.    SEASONAL ALLERGIES   - Start fluticasone nasal spray 2 sprays per nostril once daily 1 hour before bedtime.  - If there is inadequate or lack of improvement on the above intranasal steroid spray, consider adding Azelastine nasal spray, 2 sprays per nostril once daily in the morning.   - Alternatively, may add cetirizine or loratadine 10 mg once daily.   - Educated patient on proper use of intranasal sprays.     MORBID OBESITY  - Recommend weight loss with diet and exercise.  - Weight loss can help in long term management of LUIS ANTONIO.  - Defer management to PCP.    HYPERTENSION  - Continue anti-hypertensive medications per PCP.  - Supportive management: low salt DASH diet (less than 2000 mg sodium intake daily), moderate intensity aerobic exercise at least 30 minutes 5 days per week, reduce stress, quit smoking, limit alcohol, lose weight, and monitor BP once daily  - PCP following. Defer management to PCP.      Follow-up in 1-2 months after pressure change    All of patient's questions were answered. She verbalizes understanding and agreement with my assessment and plan. Refer to after-visit-summary (AVS) for patient education and more details on sleep study preparation, troubleshooting issues with PAP usage, proper cleaning instructions of PAP supplies, and usual recommended replacement schedule for each of the PAP supplies.

## 2024-11-07 PROBLEM — E66.01 MORBID OBESITY (MULTI): Status: RESOLVED | Noted: 2024-04-30 | Resolved: 2024-11-07

## 2024-11-07 PROBLEM — H00.19 CHALAZION: Status: RESOLVED | Noted: 2023-10-24 | Resolved: 2024-11-07

## 2024-11-07 PROBLEM — R29.818 SUSPECTED SLEEP APNEA: Status: RESOLVED | Noted: 2023-10-24 | Resolved: 2024-11-07

## 2024-11-07 NOTE — PATIENT INSTRUCTIONS
"Thank you for coming to the Sleep Medicine Clinic today! Your sleep medicine provider today was: Teressa De La Rosa MD Below is a summary of your treatment plan, patient education, other important information, and our contact numbers.      TREATMENT PLAN     - Do the following testing: Split night sleep study  - Please read the \"Patient Education\" section below for more detailed information. Try implementing tips, reminders, strategies, and supportive management.   - If not yet done, please sign up for [employer] to make a future schedule, send prescription requests, or send messages.    Follow-up Appointment:   Follow-up in 2-3 weeks after sleep study.    PATIENT EDUCATION     OBSTRUCTIVE SLEEP APNEA (LUIS ANTONIO) is a sleep disorder where your upper airway muscles relax during sleep and the airway intermittently and repetitively narrows and collapses leading to partially blocked airway (hypopnea) or completely blocked airway (apnea) which, in turn, can disrupt breathing in sleep, lower oxygen levels while you sleep and cause night time wakings. Because both apnea and hypopnea may cause higher carbon dioxide or low oxygen levels, untreated LUIS ANTONIO can lead to heart arrhythmia, elevation of blood pressure, and make it harder for the body to consolidate memory and facilitate metabolism (leading to higher blood sugars at night). Frequent partial arousals occur during sleep resulting in sleep deprivation and daytime sleepiness. LUIS ANTONIO is associated with an increased risk of cardiovascular disease, stroke, hypertension, and insulin resistance. Moreover, untreated LUIS ANTONIO with excessive daytime sleepiness can increase the risk of motor vehicular accidents.    Below are conservative strategies for LUIS ANTONIO regardless of LUIS ANTONIO severity are:   Positional therapy - Avoid sleeping on your back.   Healthy diet and regular exercise to optimize weight is highly encouraged.   Avoid alcohol late in the evening and sedative-hypnotics as these substances can make " sleep apnea worse.   Improve breathing through the nose with intranasal steroid spray, saline rinse, or antihistamines    Safety: Avoid driving vehicle and operating heavy equipment while sleepy. Drowsy driving may lead to life-threatening motor vehicle accidents. A person driving while sleepy is 5 times more likely to have an accident. If you feel sleepy, pull over and take a short power nap (sleep for less than 30 minutes). Otherwise, ask somebody to drive you.    Treatment options for sleep apnea include weight management, positional therapy, Positive Airway Therapy (PAP) therapy, oral appliance therapy, hypoglossal nerve stimulator (Inspire) and select airway surgeries.    Sleep Testing for sleep apnea: The best and ideal way to check out if you have sleep apnea is to do an overnight sleep study in the sleep laboratory. Alternatively, a home sleep apnea test can also be done depending on your insurance and risk factors.     If you are having a home sleep apnea test, kindly allot 1 hour during pickup of the testing kit as you will have to complete paperwork and listen to the sleep technician for in-person on-the-spot demonstration and instructions on how to hook up the testing kit at home. Do the test for 1 day and start off with sleeping on your back. If you sleep on your side in the middle of night or you have always been a side or stomach-sleeper, it is ok as long as you have some time on your back and off-back.     If you are having an overnight in-lab sleep study, please make sure to bring toiletries, a comfy pillow, additional warm blankets, and any nighttime medications (include as-needed inhaler, pain pill, etc) that you may regularly take. Also, be sure to eat dinner before you arrive as we generally do not provide meals inside the sleep testing center. Lastly, in order to fall asleep faster in the sleep testing center, we advise patients to wake up 2 hours earlier on the morning of scheduled testing and  avoid napping 2 days prior testing. Sometimes, your sleep provider may prescribe a sleep aid to be taken at lights out in the sleep testing center. If you are taking a sleep aid, consider having somebody pick you up after the sleep testing.    Overnight sleep studies may be scheduled on a weekday or weekend. We also perform daytime testing for shift workers on a case-by-case basis.    Once you have booked an appointment to do the sleep study, please contact my office for follow-up visit to discuss results.    On the other hand, if you have any of the following, please consider calling the sleep testing center to RESCHEDULE your sleep study appointment:  If you tested positive for COVID within 10 days of your sleep study appointment.  If you were exposed to somebody who was confirmed for COVID within 10 days of your sleep study appointment and now you are having symptoms of possible COVID  If you have fever>100F or any acute symptoms that you think will lead to poor sleep during testing (e.g. new or worsening stuffy nose not relieved by steroid nasal spray)  If you have traveled domestically or internationally in the last month and now you are having symptoms of possible COVID    You can also go to the following EDUCATION WEBSITES for further information:   American Academy of Sleep Medicine http://sleepeducation.org  National Sleep Foundation: https://sleepfoundation.org  American Sleep Apnea Association: https://www.sleepapnea.org (for patients with sleep apnea)  Narcolepsy Network: https://www.narcolepsynetwork.org (for patients with narcolepsy)  [employer]: https://www.wakeupnarcolepsy.org (for patients with narcolepsy)  Hypersomnia Foundation: https://www.hypersomniafoundation.org (for patients with idiopathic hypersomnia)  RLS foundation: https://www.rls.org (for patients with restless leg syndrome)    IMPORTANT INFORMATION     Call 911 for medical emergencies.  Our offices are generally open from  Monday-Friday, 8 am - 5 pm.   There are no supporting services by either the sleep doctors or their staff on weekends and Holidays, or after 5 PM on weekdays.   If you need to get in touch with me, you may either call my office number or you can use [email or web address].  If a referral for a test, for CPAP, or for another specialist was made, and you have not heard about scheduling this within a week, please call scheduling at [phone] ([phone]).  If you are unable to make your appointment for clinic or an overnight study, kindly call the office or sleep testing center at least 48 hours in advance to cancel and reschedule.  If you are on CPAP, please bring your device's card and/or the device to each clinic appointment.   In case of problems with PAP machine or mask interface, please contact your [employer] ([employer]urable Medical [employer]) company first. [employer] is the company who provides you the machine and/or PAP supplies.       PRESCRIPTIONS     We require 7 days advanced notice for prescription refills. If we do not receive the request in this time, we cannot guarantee that your medication will be refilled in time.    IMPORTANT PHONE NUMBERS     Sleep Medicine Clinic Fax: [phone]  Appointments (for Pediatric Sleep Clinic): [phone] - option 1  Appointments (for Adult Sleep Clinic): [phone] - option 2  Appointments (For Sleep Studies): [phone] - option 3  Behavioral Sleep Medicine: [phone]  Sleep Surgery: [phone]  Nutrition Service: [phone]  Weight management clinics with endocrinology: [phone]  Bariatric Services: [phone] (includes weight loss medications and weight loss surgery)  [facility]: [phone] (offers holistic approaches to weight management)  ENT (Otolaryngology): [phone]  Headache Clinic (Neurology): [phone]  Neurology: [phone]  Psychiatry: [phone]  Pulmonary Function Testing (PFT) Center: [phone]  Pulmonary Medicine:  [phone]  Medical Service [employer] (DME): [phone]      OUR SLEEP TESTING LOCATIONS     Our team will contact you to schedule your sleep study, however, you can contact us as follow:  Main Phone Line (scheduling only): [phone]), option 3  Adult and Pediatric Locations  [facility] (6 years and older): [facility] - 4th floor ([address]) After hours line: [phone]  [facility] (Main campus: All ages): [facility], 6th floor. After hours line: [phone]   [place] (5 years and older; younger considered on case-by-case basis): [address]; [address], [address]. Scheduling  After hours line: [phone]   [place] (6 years and older): [address]; [address]; [address]   [place] (6 years and older): [address]  After hours line: [phone]   [place] (13 years and older) in [place]: [address], 2nd floor  After hours line: [phone]  [facility] (13 year and older): [address]  After hours line: [phone]     Adult Only Locations:   [place] (18 years and older): [address] [facility], 2nd floor   [place] (18 years and older): [address]; 4th floor  After hours line: [phone]  [facility] (18 years and older) at [place]: [address]  After hours line: [phone]     CONTACTING YOUR SLEEP MEDICINE PROVIDER AND SLEEP TEAM      For issues with your machine or mask interface, please call your DME provider first. [employer] stands for durable medical company. [employer] is the company who provides you the machine and/or PAP supplies / accessories.   To schedule, cancel, or reschedule SLEEP STUDY APPOINTMENTS, please call the Main Phone Line at [phone] - option 3.   To schedule, cancel, or reschedule CLINIC APPOINTMENTS, you can do it in \"MyChart\", call [phone] (direct line) to speak with my " "practice lead (Catie Hernandez), or call the Main Phone Line at [phone] - option 2  For CLINICAL QUESTIONS or MEDICATION REFILLS, please call direct line for Adult Sleep Nurses at [phone].   Lastly, you can also send a message directly to your provider through \"My Chart\", which is the email service through your  Records Account: [email or web address]       Here at [facility], we wish you a restful sleep!    "

## 2024-11-21 ENCOUNTER — TELEPHONE (OUTPATIENT)
Dept: SURGERY | Facility: HOSPITAL | Age: 44
End: 2024-11-21

## 2024-11-21 ENCOUNTER — OFFICE VISIT (OUTPATIENT)
Dept: ORTHOPEDIC SURGERY | Facility: CLINIC | Age: 44
End: 2024-11-21
Payer: COMMERCIAL

## 2024-11-21 DIAGNOSIS — M17.12 PRIMARY OSTEOARTHRITIS OF LEFT KNEE: Primary | ICD-10-CM

## 2024-11-21 PROCEDURE — 99214 OFFICE O/P EST MOD 30 MIN: CPT | Performed by: PHYSICIAN ASSISTANT

## 2024-11-21 PROCEDURE — 2500000004 HC RX 250 GENERAL PHARMACY W/ HCPCS (ALT 636 FOR OP/ED): Mod: JZ | Performed by: PHYSICIAN ASSISTANT

## 2024-11-21 PROCEDURE — 20610 DRAIN/INJ JOINT/BURSA W/O US: CPT | Mod: LT | Performed by: PHYSICIAN ASSISTANT

## 2024-11-21 PROCEDURE — 1036F TOBACCO NON-USER: CPT | Performed by: PHYSICIAN ASSISTANT

## 2024-11-21 ASSESSMENT — PAIN DESCRIPTION - DESCRIPTORS: DESCRIPTORS: ACHING

## 2024-11-21 ASSESSMENT — PAIN SCALES - GENERAL: PAINLEVEL_OUTOF10: 9

## 2024-11-21 ASSESSMENT — PAIN - FUNCTIONAL ASSESSMENT: PAIN_FUNCTIONAL_ASSESSMENT: 0-10

## 2024-11-21 NOTE — TELEPHONE ENCOUNTER
Telephone call placed to patient regarding interest in bariatric surgery program. Unable to reach at this time. Left voice message requesting return call to review and contact information provided. Reviewed that she will be contact once more prior to being moved to drop status in the program.

## 2024-11-21 NOTE — LETTER
November 21, 2024     Patient: Pamela Finn   YOB: 1980   Date of Visit: 11/21/2024       To Whom It May Concern:    It is my medical opinion that Pamela Finn may return to work on Monday, November 25, 2024 due to the injection procedure she received today in the office .    If you have any questions or concerns, please don't hesitate to call.         Sincerely,        Vanna Vazquez PA-C    CC: No Recipients

## 2024-11-21 NOTE — PROGRESS NOTES
HPI  Patient is a 44 y.o. female with medical history significant for GERD, HTN, obesity, LUIS ANTONIO, OA who presents today for the 1st of 3 Euflexxa injections in her left knee only. She is asking about gel injections, as she had one in the past, prior to seeing me and it did provide her with good relief for more than 6 months.  She works as a  for [employer] and has been off all summer, so the knee is not aching as much as it usually would be. She denies recent injury or trauma to the left knee, denies fever/chills, N/T or calf pain.     ROS: All other systems have been reviewed and are negative except as previously noted in history of present illness.    Gen: The patient is alert and oriented ×3, is in no acute distress, and appear their stated age and weight.  Psychiatric: Mood and affect are appropriate.  Eyes: Sclera are white, and pupils are round and symmetric.  ENT: Mucous membranes are moist.   Neck: Supple. Thyroid is midline.  Respiratory: Respirations are nonlabored, chest rise is symmetric.  Cardiac: Rate is regular by palpation of distal pulses.   Abdomen: Nondistended.  Integument: No obvious cutaneous lesions are noted. No signs of lymphangitis. No signs of systemic edema.    Musculoskeletal: LLE  skin intact, no wounds or breakdown noted  mild lower leg edema  TTP joint line medial and patellofemoral   no knee effusion noted  compartments soft  no calf tenderness  sensation intact to light touch  motor intact including TA/GS/EHL  palpable DP/PT pulses 2+  stable to valgus/varus stress exams at 30 degrees of flexion  knee motion: 0-120 degrees     XR: Prior images of left knee reviewed personally by me today and reveal mild arthritis with joint space narrowing noted in patellofemoral compartment, osteophyte formation    IMP:  Problem List Items Addressed This Visit       Primary osteoarthritis of left knee - Primary         DISCUSSION: I discussed the conservative treatment options  for osteoarthritis including physical therapy, NSAIDs and viscosupplementation injection and briefly discussed indications for surgery. Patient should try to delay joint replacement surgery for as long as possible. If the patient's joint problem affects their quality of life and cause significant restriction of their activities, they may want to consider joint replacement surgery. I reviewed the importance for adopting a low impact lifestyle and avoidance of joint overloading activities. I explained the risks and benefits of the injection.  Patient verbalized understanding and wishes to proceed with 1st of 3 Euflexxa injections for the left knee again today.     Patient ID: Pamela Finn is a 44 y.o. female.    L Inj/Asp: L knee on 11/21/2024 10:43 AM  Indications: pain  Details: 22 G needle, anterolateral approach  Medications: 20 mg sodium hyaluronate 10 mg/mL(mw 2.4 -3.6 million)  Procedure, treatment alternatives, risks and benefits explained, specific risks discussed. Consent was given by the patient. Immediately prior to procedure a time out was called to verify the correct patient, procedure, equipment, support staff and site/side marked as required. Patient was prepped and draped in the usual sterile fashion.         PLAN:  Patient should observe for signs of infection and/or adverse reactions (redness, significant increase in pain, fever, nausea or vomiting) after receiving an injection today. If you develop any of the above symptoms, please contact my office. Patient should see the maximum effect in 6 weeks and can receive another viscosupplementation injection no sooner than 6 months from the 3rd Euflexxa injection. Patient will continue with activities as tolerated. Mobilize to prevent stiffness. Follow up in 1 week for the 2nd of 3 Euflexxa injection, no x-rays needed. All questions were answered.

## 2024-12-05 ENCOUNTER — TELEPHONE (OUTPATIENT)
Dept: SURGERY | Facility: HOSPITAL | Age: 44
End: 2024-12-05

## 2024-12-05 ENCOUNTER — OFFICE VISIT (OUTPATIENT)
Dept: ORTHOPEDIC SURGERY | Facility: CLINIC | Age: 44
End: 2024-12-05
Payer: COMMERCIAL

## 2024-12-05 DIAGNOSIS — M17.12 PRIMARY OSTEOARTHRITIS OF LEFT KNEE: Primary | ICD-10-CM

## 2024-12-05 PROCEDURE — 2500000004 HC RX 250 GENERAL PHARMACY W/ HCPCS (ALT 636 FOR OP/ED): Mod: JZ | Performed by: PHYSICIAN ASSISTANT

## 2024-12-05 PROCEDURE — 20610 DRAIN/INJ JOINT/BURSA W/O US: CPT | Mod: LT | Performed by: PHYSICIAN ASSISTANT

## 2024-12-05 PROCEDURE — 1036F TOBACCO NON-USER: CPT | Performed by: PHYSICIAN ASSISTANT

## 2024-12-05 ASSESSMENT — PAIN - FUNCTIONAL ASSESSMENT: PAIN_FUNCTIONAL_ASSESSMENT: NO/DENIES PAIN

## 2024-12-05 NOTE — PROGRESS NOTES
HPI  Patient is a 44 y.o. female with medical history significant for GERD, HTN, obesity, LUIS ANTONIO, OA who presents today for the 2nd of 3 Euflexxa injections in her left knee only. She works as a  for [employer] and now that school has resumed, she is noticing more left knee pain from the increased activity. She denies recent injury or trauma to the left knee, denies fever/chills, N/T or calf pain.     ROS: All other systems have been reviewed and are negative except as previously noted in history of present illness.    Gen: The patient is alert and oriented ×3, is in no acute distress, and appear their stated age and weight.  Psychiatric: Mood and affect are appropriate.  Eyes: Sclera are white, and pupils are round and symmetric.  ENT: Mucous membranes are moist.   Neck: Supple. Thyroid is midline.  Respiratory: Respirations are nonlabored, chest rise is symmetric.  Cardiac: Rate is regular by palpation of distal pulses.   Abdomen: Nondistended.  Integument: No obvious cutaneous lesions are noted. No signs of lymphangitis. No signs of systemic edema.    Musculoskeletal: LLE  skin intact, no wounds or breakdown noted  mild lower leg edema  TTP joint line medial and patellofemoral   no knee effusion noted  compartments soft  no calf tenderness  sensation intact to light touch  motor intact including TA/GS/EHL  palpable DP/PT pulses 2+  stable to valgus/varus stress exams at 30 degrees of flexion  knee motion: 0-120 degrees     XR: Prior images of left knee reviewed personally by me today and reveal mild arthritis with joint space narrowing noted in patellofemoral compartment, osteophyte formation    IMP:  Problem List Items Addressed This Visit       Primary osteoarthritis of left knee - Primary         DISCUSSION: I discussed the conservative treatment options for osteoarthritis including physical therapy, NSAIDs and viscosupplementation injection and briefly discussed indications for surgery.  Patient should try to delay joint replacement surgery for as long as possible. If the patient's joint problem affects their quality of life and cause significant restriction of their activities, they may want to consider joint replacement surgery. I reviewed the importance for adopting a low impact lifestyle and avoidance of joint overloading activities. I explained the risks and benefits of the injection.  Patient verbalized understanding and wishes to proceed with 2nd of 3 Euflexxa injections for the left knee again today.     Patient ID: Pamela Finn is a 44 y.o. female.    L Inj/Asp: L knee on 12/5/2024 1:34 PM  Indications: pain  Details: 22 G needle, anterolateral approach  Medications: 20 mg sodium hyaluronate 10 mg/mL(mw 2.4 -3.6 million)  Procedure, treatment alternatives, risks and benefits explained, specific risks discussed. Consent was given by the patient. Immediately prior to procedure a time out was called to verify the correct patient, procedure, equipment, support staff and site/side marked as required. Patient was prepped and draped in the usual sterile fashion.         PLAN:  Patient should observe for signs of infection and/or adverse reactions (redness, significant increase in pain, fever, nausea or vomiting) after receiving an injection today. If you develop any of the above symptoms, please contact my office. Patient should see the maximum effect in 6 weeks and can receive another viscosupplementation injection no sooner than 6 months from the 3rd Euflexxa injection. Patient will continue with activities as tolerated. Mobilize to prevent stiffness. Follow up in 1 week for the 3rd of 3 Euflexxa injection, no x-rays needed. All questions were answered.

## 2024-12-05 NOTE — LETTER
December 5, 2024     Patient: Pamela Finn   YOB: 1980   Date of Visit: 12/5/2024       To Whom It May Concern:    It is my medical opinion that Pamela Finn  may return to work tomorrow, December 6, 2024 due to the injection procedure she received in the Orthopedic office today .    If you have any questions or concerns, please don't hesitate to call.         Sincerely,        Vanna Vazquez PA-C    CC: No Recipients

## 2024-12-05 NOTE — TELEPHONE ENCOUNTER
Incoming call from patient. Message left stating she is still interested in bariatric surgery program. Attempted to return call to patient. Unable to reach at this time. Left voice message requesting return call. Will reschedule with Dr. Santoro for annual visit.     Addendum: Patient returned call. She states she will be switching insurance to [employer] for the new year. Asked that she let us know when she has her information so our insurance verification team can check her benefits. She has been trying to get her sleep study scheduled, but was told the department is waiting to get their new schedule. Discussed calling to schedule visit for psychological evaluation. She verbalized understanding.

## 2024-12-10 ENCOUNTER — TELEPHONE (OUTPATIENT)
Dept: SURGERY | Facility: HOSPITAL | Age: 44
End: 2024-12-10

## 2024-12-12 ENCOUNTER — OFFICE VISIT (OUTPATIENT)
Dept: ORTHOPEDIC SURGERY | Facility: CLINIC | Age: 44
End: 2024-12-12
Payer: COMMERCIAL

## 2024-12-12 DIAGNOSIS — M17.12 PRIMARY OSTEOARTHRITIS OF LEFT KNEE: Primary | ICD-10-CM

## 2024-12-12 PROCEDURE — 20610 DRAIN/INJ JOINT/BURSA W/O US: CPT | Mod: LT | Performed by: PHYSICIAN ASSISTANT

## 2024-12-12 PROCEDURE — 2500000004 HC RX 250 GENERAL PHARMACY W/ HCPCS (ALT 636 FOR OP/ED): Mod: JZ | Performed by: PHYSICIAN ASSISTANT

## 2024-12-12 NOTE — LETTER
December 12, 2024     Patient: Pamela Finn   YOB: 1980   Date of Visit: 12/12/2024       To Whom It May Concern:    It is my medical opinion that Pamela Finn may return to work on Friday, December 13, 2024 full duty with no restrictions. She did have an injection procedure for the left knee today in the Orthopedic office .    If you have any questions or concerns, please don't hesitate to call.         Sincerely,        Vanna Vazquez PA-C    CC: No Recipients

## 2024-12-12 NOTE — PROGRESS NOTES
HPI  Patient is a 44 y.o. female with medical history significant for GERD, HTN, obesity, LUIS ANTONIO, OA who presents today for the 3rd of 3 Euflexxa injections in her left knee only. She has not noticed much pain relief just yet. She works as a  for [employer] and noticed more left knee pain from the increased activity. She denies recent injury or trauma to the left knee, denies fever/chills, N/T or calf pain.     ROS: All other systems have been reviewed and are negative except as previously noted in history of present illness.    Gen: The patient is alert and oriented ×3, is in no acute distress, and appear their stated age and weight.  Psychiatric: Mood and affect are appropriate.  Eyes: Sclera are white, and pupils are round and symmetric.  ENT: Mucous membranes are moist.   Neck: Supple. Thyroid is midline.  Respiratory: Respirations are nonlabored, chest rise is symmetric.  Cardiac: Rate is regular by palpation of distal pulses.   Abdomen: Nondistended.  Integument: No obvious cutaneous lesions are noted. No signs of lymphangitis. No signs of systemic edema.    Musculoskeletal: LLE  skin intact, no wounds or breakdown noted  mild lower leg edema  TTP joint line medial and patellofemoral   no knee effusion noted  compartments soft  no calf tenderness  sensation intact to light touch  motor intact including TA/GS/EHL  palpable DP/PT pulses 2+  stable to valgus/varus stress exams at 30 degrees of flexion  knee motion: 0-120 degrees     XR: Prior images of left knee reviewed personally by me today and reveal mild arthritis with joint space narrowing noted in patellofemoral compartment, osteophyte formation    IMP:  Problem List Items Addressed This Visit       Primary osteoarthritis of left knee - Primary         DISCUSSION: I discussed the conservative treatment options for osteoarthritis including physical therapy, NSAIDs and viscosupplementation injection and briefly discussed indications for  surgery. Patient should try to delay joint replacement surgery for as long as possible. If the patient's joint problem affects their quality of life and cause significant restriction of their activities, they may want to consider joint replacement surgery. I reviewed the importance for adopting a low impact lifestyle and avoidance of joint overloading activities. I explained the risks and benefits of the injection.  Patient verbalized understanding and wishes to proceed with 3rd of 3 Euflexxa injections for the left knee again today.     Patient ID: Pamela Finn is a 44 y.o. female.    L Inj/Asp: L knee on 12/12/2024 12:27 PM  Indications: pain  Details: 22 G needle, anterolateral approach  Medications: 20 mg sodium hyaluronate 10 mg/mL(mw 2.4 -3.6 million)  Procedure, treatment alternatives, risks and benefits explained, specific risks discussed. Consent was given by the patient. Immediately prior to procedure a time out was called to verify the correct patient, procedure, equipment, support staff and site/side marked as required. Patient was prepped and draped in the usual sterile fashion.         PLAN:  Patient should observe for signs of infection and/or adverse reactions (redness, significant increase in pain, fever, nausea or vomiting) after receiving an injection today. If you develop any of the above symptoms, please contact my office. Patient should see the maximum effect in 6 weeks from this 3rd injection and can receive another viscosupplementation injection no sooner than 6 months from the 3rd Euflexxa injection. Patient will continue with activities as tolerated. Mobilize to prevent stiffness. Follow up in 6 months, no x-rays needed. All questions were answered.

## 2024-12-24 DIAGNOSIS — T78.40XS ALLERGY, SEQUELA: ICD-10-CM

## 2024-12-26 RX ORDER — MONTELUKAST SODIUM 10 MG/1
10 TABLET ORAL DAILY
Qty: 90 TABLET | Refills: 3 | Status: SHIPPED | OUTPATIENT
Start: 2024-12-26

## 2025-01-09 DIAGNOSIS — M17.12 PRIMARY OSTEOARTHRITIS OF LEFT KNEE: ICD-10-CM

## 2025-01-13 RX ORDER — MELOXICAM 15 MG/1
15 TABLET ORAL DAILY
Qty: 30 TABLET | Refills: 1 | Status: SHIPPED | OUTPATIENT
Start: 2025-01-13

## 2025-01-27 NOTE — PROGRESS NOTES
BARIATRIC SURGERY CLINIC  BARIATRIC NEW PATIENT/INITIAL VISIT NOTE    Clinic Date: 1/29/25    Pamela Finn, 44 y.o.  MRN: [id number]    Initial weight:   Wt Readings from Last 1 Encounters:   01/29/25 134 kg (296 lb)    Body mass index is 49.26 kg/m².     Surgeon: Hien Santoro MD    This is a 43 y.o. female with morbid obesity (Body mass index is 48.42 kg/m².) who presents to clinic for consideration of bariatric surgery. she has attempted and failed multiple diet and exercise regimens for weight loss. Initial Onset of obesity was in childhood.  Their goal for surgery is to  be healthier . The patient has tried multiple diets to lose weight including  meal replacement shakes . The patient was most successful with the  meal replacement shakes . The most pounds lost on this diet were 75 lbs. The patient considers their dietary weakness to be  irregular eating patterns, doesn't eat until late in the day, then eats high fat foods like ice cream, also drinks coke to help with migraines.  The patient reports a  highest weight ever of 299 pounds and lowest weight ever of 130 pounds Distribution of Obesity: is general. Current diet:  none . Compliance: N/A Problems:  irregular eating patterns  } Dietary Details Include:Dietary Details: 1-2 servings fruits and vegetables, 1 serving protein.  The patient does not exercise     Types of Exercise : does not exercise   Felt she had to address some personal issues and got some mental health support.   She has not talked to them about weight loss surgery. She says she feels committed and her  was against it.  She feels sh wants to do best for you.   She says she made some lifestyle changes but then she got really depressed and started emotional eating.  She has yet to find a new stress reliever.      She strill notes sweets to be a weakness.   Comorbidities: sleep apnea not using an appliance, arthritis left knee     Tried different things to lose weight and only  successful for a little while.  She has researched surgery for a while.      Unsure which surgery    She is on pantoprazole-she would get nausea after eating with some foods. Could not determine what food and then started PPI and nausea has gone away        On PPI     How bad is the heartburn? 0 = No symptoms  Heartburn when lying down? 0 = No symptoms  Heartburn when standing up? 0 = No symptoms  Heartburn after meals? 0 = No symptoms  Does heartburn change your diet? 0 = No symptoms  Does heartburn wake you from sleep? 0 = No symptoms  Do you have difficulty swallowing? 0 = No symptoms  Do you have pain with swallowing? 0 = No symptoms  If you take medication, does this affect your daily life? 0 = No symptoms  How bad is the regurgitation? 0 = No symptoms  Regurgitation when lying down? 0 = No symptoms  Regurgitation when standing up? 0 = No symptoms  Regurgitation after meals? 0 = No symptoms  Does regurgitation change your diet? 0 = No symptoms  Does regurgitation wake you from sleep? 0 = No symptoms  How satisfied are you with your present condition? Satisfied       PSH:   Past Surgical History:   Procedure Laterality Date    BREAST BIOPSY Right     benign (fibroadenoma)    CHOLECYSTECTOMY  2004    laparascopic    KNEE Left 2020    meniscus repair    OTHER SURGICAL HISTORY  04/02/2019    Laparascopic Hysterectomy    OTHER SURGICAL HISTORY Right 2009    sweat gland removal        FAMILY HISTORY:  Family History   Problem Relation Name Age of Onset    Hypertension Mother Oscoyeimy [place]eston     Arthritis Mother Oscoyeimy [place]     Asthma Mother Osco Ballard     Diabetes Mother Ivetteyeimy Ballard     Stroke Mother Ivette Ballard     Alcohol abuse Father Aman Ballard Sr     Diabetes Father Aman Ballard Sr     Hypertension Father Aman Ballard Sr     Glaucoma Maternal Grandfather      Breast cancer Other Grandparent         SOCIAL HISTORY:  Social History     Tobacco Use    Smoking status: Never    Smokeless tobacco: Never    Substance Use Topics    Alcohol use: Not Currently     Comment: no alcohol in 3 yrs.    Drug use: Never       MEDICATIONS:  Prior to Admission Medications:  Medication Documentation Review Audit       Reviewed by Jaimie Mooney CMA (Medical Assistant) on 25 at 1227      Medication Order Taking? Sig Documenting Provider Last Dose Status   azelastine (Optivar) 0.05 % ophthalmic solution [id number]  Administer into affected eye(s). Historical Provider, MD  Active   buPROPion XL (Wellbutrin XL) 300 mg 24 hr tablet [id number]  Take 1 tablet (300 mg) by mouth once daily. Nikki Mclaughlin MD  Active   estradiol (Estrace) 0.01 % (0.1 mg/gram) vaginal cream [id number]  Insert 0.125 Applicatorfuls (0.5 g) into the vagina 3 times a week. Use a pea-sized amount at the vaginal opening nightly for 2 weeks, then 3 times weekly thereafter. Felicita Forrest MD  Active   fluticasone (Flonase) 50 mcg/actuation nasal spray [id number]  ADMINISTER 1 SPRAY INTO EACH NOSTRIL ONCE DAILY. SHAKE GENTLY. BEFORE FIRST USE, PRIME PUMP. AFTER USE, CLEAN TIP AND REPLACE CAP. Nikki Mclaughlin MD   24 2359   meloxicam (Mobic) 15 mg tablet [id number]  TAKE 1 TABLET BY MOUTH EVERY DAY Ny Osuna PA-C  Active   montelukast (Singulair) 10 mg tablet [id number]  TAKE 1 TABLET BY MOUTH EVERY DAY Nikki Mclaughlin MD  Active   pantoprazole (ProtoNix) 40 mg EC tablet [id number]  Take 1 tablet (40 mg) by mouth once daily in the morning. Take before meals. Do not crush, chew, or split. Nikki Mclaughlin MD   24 2359   rizatriptan (Maxalt) 10 mg tablet [id number]  1po under the tongue on onset of severe headache may repeat it again after 8 hours up to 2 a day Historical Provider, MD  Active   sertraline (Zoloft) 25 mg tablet [id number]  Take 1 tablet (25 mg) by mouth once daily. Historical Provider, MD  Active   topiramate (Topamax) 25 mg tablet [id number]  TAKE 2 TABLETS BY MOUTH TWICE A DAY Nikki  YOLIE Mclaughlin MD  Active                     ALLERGIES:  Allergies   Allergen Reactions    Amoxicillin Swelling     Hives and face swelling    Animal Dander Runny nose    House Dust Mite Runny nose    Mold Extracts Runny nose    Oxycodone Swelling     itching and all over swelling    Oxycodone-Acetaminophen Other    Penicillin G Unknown    Pollen Extracts Runny nose    Penicillin Rash       REVIEW OF SYSTEMS:  GENERAL: Negative for malaise, significant weight loss and fever  HEAD: Negative for headache, swelling.  NECK: Negative for lumps, goiter, pain and significant neck swelling  RESPIRATORY: Negative for cough, wheezing or shortness of breath.  CARDIOVASCULAR: Negative for chest pain, leg swelling or palpitations.  GI: Negative for abdominal discomfort, blood in stools or black stools or change in bowel habits  : No history of dysuria, frequency or incontinence  MUSCULOSKELETAL: Negative for joint pain or swelling, back pain or muscle pain.  SKIN: Negative for lesions, rash, and itching.  PSYCH: Negative for sleep disturbance, mood disorder and recent psychosocial stressors.  ENDOCRINE: Negative for cold or heat intolerance, polyuria, polydipsia and goiter.    Objective   PHYSICAL EXAM:  Visit Vitals  /85   Pulse 72   Wt 134 kg (296 lb)   BMI 49.26 kg/m²   OB Status Hysterectomy   Smoking Status Never   BSA 2.48 m²    Body mass index is 49.26 kg/m².  General appearance: obese, NAD  Neuro: AOx3  Head: EOMI; no swelling or lesions of scalp or face  ENT:  no lumps or lymphadenopathy, thyroid normal to palpation; oropharynx clear, no swelling or erythema  Skin: warm, no erythema or rashes  Lungs: clear to percussion and auscultation  Heart: regular rhythm and S1, S2 normal  Abdomen: soft, non-tender, no masses, no organomegaly  Extremities: Normal exam of the extremities. No swelling or pain.  Psych: no hurried speech, no flight of ideas, normal affect    IMPRESSION:  Pamela Finn is a 44 y.o. female with  a bmi of Body mass index is 49.26 kg/m². with the following diagnoses and co-morbidities: gerd, depression, faheem and not using appliance.  Due for a new study.   She is low risk for complication.   Since she saw us last year, Felt she had to address some personal issues and got some mental health support.   She has not talked to them about weight loss surgery. She says she feels committed and her  was against it.  She feels sh wants to do best for you.   She says she made some lifestyle changes but then she got really depressed and started emotional eating.  She has yet to find a new stress reliever.    She is trying to do more activities and working less and spending more time with her family.   She is on pantoprazole that helps with GERD but does not make it go away completely and still not using her cpap  Seen a year ago and we discussed the following:  Pamela Finn is a 43 y.o. female with a bmi of Body mass index is 48.42 kg/m². with the following diagnoses and co-morbidities:  FAHEEM, knee pain and all can be helped with weight loss.  She notes sweets to be her weakness.  We discussed the bypass and the sleeve at length.  I reassured her with the data with the weight loss and hair loss and complications.  We discussed nsaid use and both procedures.  We also discussed her propensity for sweets may make the bypass more attractive. ;    She still would like to proceed with bypass due to her reflux.   We discussed the bypass  at [facility] and all questions were answered. risks and benefits were discussed  The patient understands the risks and benefits of the procedure and how the procedure is performed. The patient understands the risks include but are not limited to bleeding, infection, DVT, PE, pneumonia, myocardial infarction, leak along the staple lines, and weight regain. We discussed lifestyle changes necessary to be successful.      This patient does meet the criteria for a surgical weight loss procedure  according to NIH guidelines.  The risks of sleeve gastrectomy, Pita-en-Y gastric bypass, and duodenal switch surgery including bleeding, leak, wound infection, dehydration, ulcers, internal hernia, DVT/PE, prolonged nausea/vomiting, incomplete resolution of associated medical conditions, reflux, weight regain, vitamin/mineral deficiencies, and death have been explained to the patient and Pamela Finn has expressed understanding and acceptance of them.     The increased risk of substance and alcohol abuse following bariatric surgery was discussed with the patient, along with the negative consequences of substance/alcohol use after surgery including addiction, worsening of mental health disorders, and injury to the stomach. The risk of smoking and vaping (tobacco or any other substance) after bariatric surgery was explained to the patient. This includes risk of anastamotic ulcers, gastritis, bleeding, perforation, stricture, and PO intolerance.  The patient expressed understanding and acceptance of these risks.    The benefits of the above surgeries including weight loss, improvement/resolution of associated medical and mental health conditions, improved mobility, and decreased mortality have been explained the the patient and Pamela Finn has expressed understanding and acceptance of them.    Assessment/Plan   PLAN:    The plan of treatment for Pamela Finn is to continue with the consultations and tests ordered today in hopes of qualifying for pre-operative clearance for bariatric surgery. This includes:  Find a new stress reliever  Make good food choices  Consult Nutrition for education and MSWL  Consult Psychology  Consult Physical Therapy for limited mobility  Consult cardiology  Consult pulmonology  Labs ordered  EGD  PCP for medical optimization  Consult sleep medicine - concern for LUIS ANTONIO    The following are some lifestyle changes you should begin to prepare you for your bypass surgery.   Eliminate soda  and other carbonated beverages from your diet. Carbonation will not be well tolerated after surgery. Try Propel, Vitamin Water Zero, Sobe Lifewater, Crystal Light or water.    Increase fluid consumption to 64 oz daily. Do not drink within 30 minutes of eating as this will liquefy your food and make you hungry more quickly.    Exercise for 30-60 minutes daily. Brisk walking, bike riding and swimming are all examples of healthy exercise. If you are unable to exercise we recommend seated exercise.    Do not skip meals.    Take a multivitamin daily.    Lose weight. In preparation for your surgery it is important that you begin making healthier food choices now. Our dietitian will meet with you to help you select foods lower in calories and   higher in nutrition. We would like you to lose at least 10  lbs prior to surgery.     Increase your protein intake to 60 grams per day.    Alcohol is empty calories. Please eliminate while preparing for surgery.    STOP Vaping/Smoking Nicotine or Marijuana IMMEDIATELY.  Plan your meals.      General Instruction:   1) Use the information we gave you today to work through your insurance requirements and medical clearances.   2) These documents need to get faxed to the program navigators so they can submit them for approval from your insurance company.   3) Obtain labs today at a  facility. We will call you with any abnormalities and corrections you need to make.   4) Continue to work with your primary care doctor and other specialist so your other health problems are well controlled prior to your surgery.   5) Adopt the recommendations of the program dietician so you develop healthy eating patterns.   6) Work with the sleep team to get your sleep apnea treated to prevent other health problems .   7) Consider attending a support group to learn from other who have been through the process.   8) Come to the MSWL sessions.     45 minutes were spent with patient including  history, physical exam, and education.    Dr. Hien Santoro M.D., MPH  Director of Bariatric & Minimally Invasive Surgery  [place] [address]  T:  [phone]  F:  [phone]     Coty Guadalupe, RN Assistant Nurse Manager, Care Coordinator Patient Nursing Contact  T: [phone] F: [phone]    Jennifer Rangel Patient Navigator - Bariatric Surgery [facility] Patient Clearance Questions & Tracking Contact  T:  [phone]  F:  [phone]

## 2025-01-28 DIAGNOSIS — G44.021 INTRACTABLE CHRONIC CLUSTER HEADACHE: Primary | ICD-10-CM

## 2025-01-29 ENCOUNTER — NUTRITION (OUTPATIENT)
Dept: SURGERY | Facility: CLINIC | Age: 45
End: 2025-01-29
Payer: COMMERCIAL

## 2025-01-29 ENCOUNTER — OFFICE VISIT (OUTPATIENT)
Dept: SURGERY | Facility: CLINIC | Age: 45
End: 2025-01-29
Payer: COMMERCIAL

## 2025-01-29 VITALS
WEIGHT: 293 LBS | DIASTOLIC BLOOD PRESSURE: 85 MMHG | BODY MASS INDEX: 49.26 KG/M2 | HEART RATE: 72 BPM | SYSTOLIC BLOOD PRESSURE: 132 MMHG

## 2025-01-29 VITALS — WEIGHT: 293 LBS | BODY MASS INDEX: 49.26 KG/M2

## 2025-01-29 DIAGNOSIS — Z98.84 BARIATRIC SURGERY STATUS: ICD-10-CM

## 2025-01-29 DIAGNOSIS — E66.01 MORBID OBESITY WITH BMI OF 45.0-49.9, ADULT (MULTI): ICD-10-CM

## 2025-01-29 DIAGNOSIS — F32.A DEPRESSION, UNSPECIFIED DEPRESSION TYPE: Primary | ICD-10-CM

## 2025-01-29 DIAGNOSIS — K21.9 GASTROESOPHAGEAL REFLUX DISEASE, UNSPECIFIED WHETHER ESOPHAGITIS PRESENT: ICD-10-CM

## 2025-01-29 PROCEDURE — 99215 OFFICE O/P EST HI 40 MIN: CPT | Performed by: SURGERY

## 2025-01-29 PROCEDURE — 3079F DIAST BP 80-89 MM HG: CPT | Performed by: SURGERY

## 2025-01-29 PROCEDURE — 1036F TOBACCO NON-USER: CPT | Performed by: SURGERY

## 2025-01-29 PROCEDURE — 3075F SYST BP GE 130 - 139MM HG: CPT | Performed by: SURGERY

## 2025-01-29 RX ORDER — BUPROPION HYDROCHLORIDE 150 MG/1
150 TABLET ORAL
COMMUNITY
Start: 2024-12-08

## 2025-01-29 NOTE — PROGRESS NOTES
Follow-up Pre-op Bariatric Nutrition Assessment    Surgeon:   Yvan   Patient is considering: RNYGB    ASSESSMENT:  Current weight:   Vitals:    01/29/25 1434   Weight: 134 kg (296 lb)     Ht:      BMI:  Body mass index is 49.26 kg/m².        Initial start weight:   #296  Pre-Op Excess Body Weight (EBW):   #171    Target Post-Op weight goal:  #159-194       Nutrition Interventions for last encounter (date): 1-  Recommendations:  1.Structure meal patterns, eating three meals and 1-2 snacks per day. No skipping meals- Ok to use a protein shake for a meal replacement or need to meal prep. Do not go more than 4-5 hrs with out eating something   2.    Drink 64oz of calorie-free, caffeine-free, and non-carbonated beverages.-water, NO pop, No red bull unless you reach your fluid goal (they need to be sugar free)- decrease them overall.     4.    Stop drinking 30 minutes before meals, nothing with meals and wait 30 minutes after meals to drink again. Make meals last 30 minutes-chew thoroughly.   5.    Increase physical activity by 10-15 minutes as tolerated to an end goal of 60 minutes 5 x per week. Consistency is the key. Need to start sit down exercises, water aerobics or utube walking videos         Pre-op Goal weight: lose 5% of body weight     Nutrition Monitoring and Evaluation: 1-2 pound weight loss per week  Criteria: weight check  Need for Follow-up: 4-6 weeks (protein sources/ food options)     24 HOUR RECALL/DIET HISTORY:  Breakfast:  skips   Snack:  none   Lunch: pasta with ground chicken   Snack: cookies on occasion   Dinner: skips   Snack: none   Beverages: gingerale, water (32oz), SF kristina aid  Alcohol: none        READINESS TO LEARN:  Motivation to learn: Interested        Understanding of instruction:   Fair     Anticipated Compliance:   Fair        Family Support: U/A          Educational Materials Provided:    None     Nutrition follow-up assessment completed today. Patient is seeking RNYGB. Going  "to do more research on the RNYGB. Cut out the red bull. Still drinking pop. Is drinking with meals. Usually eating 1-2 meals a day and maybe 1 snack a day. Meal inconsistency is still an issue. Did try some of the protein shake but \"it gets expensive.\" Reviewed again options for protein shakes. Movement is low. Enjoyed water aerobics but stopped after the summer. Patient is motivated to improve nutrition behaviors. Patient is fully aware of the below nutrition goals.     Patient was receptive to nutritional recommendations, asked numerous questions, and verbalized understanding of the weight loss surgery diet.  Patient expressed understanding about the importance of strict dietary compliance post-surgery to avoid nutritional deficiencies and achieve optimal weight loss and verbalized intent to follow dietary recommendations.    Malnutrition Screening:   Significant unintentional weight loss? n/a   Eating less than 75% of usual intake for more than 2 weeks? n/a      Nutrition Diagnosis:   Overweight/obesity related to excess energy intake as evidenced by BMI >= 40 kg/m^2.  Food- and nutrition-related knowledge deficit related to lack of prior exposure to surgical weight loss information as evidenced by pt new to surgical program.    Nutrition Interventions:   Modify type and amount of food and nutrients within meals and snacks.  Comprehensive Nutrition Education    Recommendations:  1.Structure meal patterns, eating three meals and 1-2 snacks per day. No skipping meals- Ok to use a protein shake for a meal replacement or need to meal prep. Do not go more than 4-5 hrs with out eating something   2. Drink 64oz of calorie-free, caffeine-free, and non-carbonated beverages.- NO POP, water, SF kristina aid    3. Stop drinking 30 minutes before meals, nothing with meals and wait 30 minutes after meals to drink again. Make meals last 30 minutes-chew thoroughly.   4, Increase physical activity by 10-15 minutes as tolerated to an " end goal of 60 minutes 5 x per week. Consistency is the key. Need to start sit down exercises, water aerobics or utube walking videos     Pre-op Goal weight: lose 5% of body weight    Nutrition Monitoring and Evaluation: 1-2 pound weight loss per week  Criteria: weight check  Need for Follow-up: 4 weeks (protein sources) -resend nutrition info     Patient does meet National Institutes Health guidelines for weight loss surgery, however needs to demonstrate consistent effort in making dietary changes before giving clearance. It is anticipated that the patient will need at least 3-4 nutritional follow-up visits prior to clearance for surgery.        Cathy Hernandez RDN, LD   Wound Care: Petrolatum

## 2025-01-29 NOTE — PATIENT INSTRUCTIONS
he plan of treatment for Pamela Finn is to continue with the consultations and tests ordered today in hopes of qualifying for pre-operative clearance for bariatric surgery. This includes:  Find a new stress reliever  Make good food choices  Consult Nutrition for education and MSWL  Consult Psychology  Consult Physical Therapy for limited mobility  Consult cardiology  Consult pulmonology  Labs ordered  EGD  PCP for medical optimization  Consult sleep medicine - concern for LUIS ANTONIO    The following are some lifestyle changes you should begin to prepare you for your bypass surgery.   Eliminate soda and other carbonated beverages from your diet. Carbonation will not be well tolerated after surgery. Try Propel, Vitamin Water Zero, Sobe Lifewater, Crystal Light or water.    Increase fluid consumption to 64 oz daily. Do not drink within 30 minutes of eating as this will liquefy your food and make you hungry more quickly.    Exercise for 30-60 minutes daily. Brisk walking, bike riding and swimming are all examples of healthy exercise. If you are unable to exercise we recommend seated exercise.    Do not skip meals.    Take a multivitamin daily.    Lose weight. In preparation for your surgery it is important that you begin making healthier food choices now. Our dietitian will meet with you to help you select foods lower in calories and   higher in nutrition. We would like you to lose at least 10  lbs prior to surgery.     Increase your protein intake to 60 grams per day.    Alcohol is empty calories. Please eliminate while preparing for surgery.    STOP Vaping/Smoking Nicotine or Marijuana IMMEDIATELY.  Plan your meals.      General Instruction:   1) Use the information we gave you today to work through your insurance requirements and medical clearances.   2) These documents need to get faxed to the program navigators so they can submit them for approval from your insurance company.   3) Obtain labs today at a  facility.  We will call you with any abnormalities and corrections you need to make.   4) Continue to work with your primary care doctor and other specialist so your other health problems are well controlled prior to your surgery.   5) Adopt the recommendations of the program dietician so you develop healthy eating patterns.   6) Work with the sleep team to get your sleep apnea treated to prevent other health problems .   7) Consider attending a support group to learn from other who have been through the process.   8) Come to the MSWL sessions.       Dr. Hien Santoro M.D., MPH  Director of Bariatric & Minimally Invasive Surgery  [place] [address]  T:  [phone]  F:  [phone]     Coty Guadalupe, RN Assistant Nurse Manager, Care Coordinator Patient Nursing Contact  T: [phone] F: [phone]    Jennifer Rangel Patient Navigator - Bariatric Surgery [facility] Patient Clearance Questions & Tracking Contact  T:  [phone]  F:  [phone]

## 2025-01-31 DIAGNOSIS — E66.01 MORBID OBESITY WITH BMI OF 45.0-49.9, ADULT (MULTI): ICD-10-CM

## 2025-01-31 DIAGNOSIS — E88.810 DYSMETABOLIC SYNDROME: ICD-10-CM

## 2025-01-31 DIAGNOSIS — K21.9 GASTROESOPHAGEAL REFLUX DISEASE, UNSPECIFIED WHETHER ESOPHAGITIS PRESENT: ICD-10-CM

## 2025-01-31 DIAGNOSIS — Z98.84 BARIATRIC SURGERY STATUS: ICD-10-CM

## 2025-02-03 ENCOUNTER — TELEPHONE (OUTPATIENT)
Dept: SURGERY | Facility: CLINIC | Age: 45
End: 2025-02-03
Payer: COMMERCIAL

## 2025-02-03 NOTE — TELEPHONE ENCOUNTER
Incoming call. Patient asking if insurance verification completed as she had a recent change. Reviewed that she would need 6 months of consecutive medically supervised weight loss classes. She inquired about ERICA, but still wants to pursue GBP surgery. No further questions at this time.

## 2025-02-04 ENCOUNTER — TELEPHONE (OUTPATIENT)
Dept: SURGERY | Facility: CLINIC | Age: 45
End: 2025-02-04
Payer: COMMERCIAL

## 2025-02-10 ENCOUNTER — TELEPHONE (OUTPATIENT)
Dept: GASTROENTEROLOGY | Facility: HOSPITAL | Age: 45
End: 2025-02-10
Payer: COMMERCIAL

## 2025-02-27 ENCOUNTER — APPOINTMENT (OUTPATIENT)
Dept: SURGERY | Facility: CLINIC | Age: 45
End: 2025-02-27
Payer: COMMERCIAL

## 2025-03-04 ENCOUNTER — APPOINTMENT (OUTPATIENT)
Dept: PRIMARY CARE | Facility: CLINIC | Age: 45
End: 2025-03-04
Payer: COMMERCIAL

## 2025-03-04 VITALS
SYSTOLIC BLOOD PRESSURE: 128 MMHG | BODY MASS INDEX: 47.09 KG/M2 | HEART RATE: 71 BPM | TEMPERATURE: 97.5 F | OXYGEN SATURATION: 98 % | HEIGHT: 66 IN | DIASTOLIC BLOOD PRESSURE: 88 MMHG | WEIGHT: 293 LBS

## 2025-03-04 DIAGNOSIS — E66.01 MORBID (SEVERE) OBESITY DUE TO EXCESS CALORIES (MULTI): ICD-10-CM

## 2025-03-04 DIAGNOSIS — E55.9 VITAMIN D DEFICIENCY: ICD-10-CM

## 2025-03-04 DIAGNOSIS — Z00.00 ROUTINE GENERAL MEDICAL EXAMINATION AT A HEALTH CARE FACILITY: ICD-10-CM

## 2025-03-04 DIAGNOSIS — Z12.39 BREAST SCREENING: ICD-10-CM

## 2025-03-04 PROCEDURE — 3008F BODY MASS INDEX DOCD: CPT | Performed by: FAMILY MEDICINE

## 2025-03-04 PROCEDURE — 3079F DIAST BP 80-89 MM HG: CPT | Performed by: FAMILY MEDICINE

## 2025-03-04 PROCEDURE — 99396 PREV VISIT EST AGE 40-64: CPT | Performed by: FAMILY MEDICINE

## 2025-03-04 PROCEDURE — 3074F SYST BP LT 130 MM HG: CPT | Performed by: FAMILY MEDICINE

## 2025-03-04 ASSESSMENT — PROMIS GLOBAL HEALTH SCALE
RATE_AVERAGE_FATIGUE: MODERATE
CARRYOUT_PHYSICAL_ACTIVITIES: A LITTLE
RATE_SOCIAL_SATISFACTION: FAIR
RATE_GENERAL_HEALTH: FAIR
RATE_AVERAGE_PAIN: 7
RATE_QUALITY_OF_LIFE: FAIR
RATE_MENTAL_HEALTH: FAIR
RATE_PHYSICAL_HEALTH: FAIR
EMOTIONAL_PROBLEMS: SOMETIMES
CARRYOUT_SOCIAL_ACTIVITIES: FAIR

## 2025-03-04 ASSESSMENT — COLUMBIA-SUICIDE SEVERITY RATING SCALE - C-SSRS
2. HAVE YOU ACTUALLY HAD ANY THOUGHTS OF KILLING YOURSELF?: NO
1. IN THE PAST MONTH, HAVE YOU WISHED YOU WERE DEAD OR WISHED YOU COULD GO TO SLEEP AND NOT WAKE UP?: NO
6. HAVE YOU EVER DONE ANYTHING, STARTED TO DO ANYTHING, OR PREPARED TO DO ANYTHING TO END YOUR LIFE?: NO

## 2025-03-04 ASSESSMENT — PATIENT HEALTH QUESTIONNAIRE - PHQ9
SUM OF ALL RESPONSES TO PHQ9 QUESTIONS 1 AND 2: 0
1. LITTLE INTEREST OR PLEASURE IN DOING THINGS: NOT AT ALL
2. FEELING DOWN, DEPRESSED OR HOPELESS: NOT AT ALL

## 2025-03-04 ASSESSMENT — ENCOUNTER SYMPTOMS: NEUROLOGICAL NEGATIVE: 1

## 2025-03-04 NOTE — PROGRESS NOTES
"Subjective   Patient ID: Pamela Finn is a 44 y.o. female who presents for Annual Exam.    HPI     Review of Systems   Neurological: Negative.    All other systems reviewed and are negative.      Objective   /88   Pulse 71   Temp 36.4 °C (97.5 °F)   Ht 1.676 m (5' 6\")   Wt 134 kg (294 lb 9.6 oz)   SpO2 98%   BMI 47.55 kg/m²     Physical Exam  Cardiovascular:      Rate and Rhythm: Regular rhythm.      Heart sounds: Normal heart sounds.   Pulmonary:      Breath sounds: Normal breath sounds.   Abdominal:      Palpations: Abdomen is soft.   Musculoskeletal:         General: Normal range of motion.   Skin:     General: Skin is warm.   Neurological:      General: No focal deficit present.      Mental Status: She is alert.   Psychiatric:         Mood and Affect: Mood normal.     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

## 2025-03-04 NOTE — PATIENT INSTRUCTIONS
If you do not want a quick fix    What you can stick to is the 3 rules    .  1.  Every day sleep  at night or rest ( some days we are unable to sleep )around the same time without the TV-this is important habit to reduce dementia and aging prematurely    2.  Eat 3 cups of green leafy vegetables daily include at least 1 fruit.,  Reduce animal protein consumption-/ also  Starch  consumption  --this will help to lose weight avoid illnesses such as dementia high blood pressure cancer.,  Diabetes--- 64 oz water    3.  Exercise including aerobics as well as resistant exercise for at least 45 minutes a day for 5 days this will also help to reduce premature aging --- try to walk 10,000 steps daily if your knees and back are hurting just take a break and make sure that you walk 10,000 steps    \  \    When you have to break a bad habit like eating [employer] make it difficult    Example if you did not have [employer]'s at your disposal at your home you do not have [employer]'s    So far out what your weaknesses so like you said you like to eat ice cream please stop buying ice cream and get rid of what you have in the house    By making it difficult you will get rid of that habit in fact you might lose the taste for ice cream in about 6 months time    So since you told me it is the sweets is your weakness stop buying ice cream ice cream and sweets cookies all that    But I just doing that your weight will shed off    You will have more energy your arthritis is in your knee will stop hurting you you do not have to be of any illness such as breast cancer diabetes colon cancer dementia    You might not even need to have gastric bypass surgery    You can have yogurt but make sure that it is plain yogurt you can put fruits and it and make it healthier    Please avoid drinking juice and pop    But again make sure you have your 3 cups of leafy green vegetables and 10,000 steps per day  I have to see you back in 3  months    Topamax is also an appetite suppressant bupropion is also an appetite suppressant    Talk to the psychiatrist you might be able to go up on your sertraline that will also might help you with reducing your appetite    Instead of Mobic try to take Tylenol extra strength for knee pain and also you can get the over-the-counter gel to rub on called Voltaren    Meloxicam also increases gastritis and you take meloxicam and Protonix and the purpose is lost  \      3 months

## 2025-03-04 NOTE — LETTER
March 4, 2025     Patient: Pamela Finn   YOB: 1980   Date of Visit: 3/4/2025       To Whom It May Concern:    Pamela Finn was seen in my clinic on 3/4/2025 at 1:30 pm. Please excuse Pamela for her absence from work on this day to make the appointment.    If you have any questions or concerns, please don't hesitate to call.         Sincerely,         Nikki Mclaughlin MD        CC: No Recipients

## 2025-03-06 ENCOUNTER — NUTRITION (OUTPATIENT)
Dept: SURGERY | Facility: CLINIC | Age: 45
End: 2025-03-06
Payer: COMMERCIAL

## 2025-03-06 VITALS — BODY MASS INDEX: 47.45 KG/M2 | WEIGHT: 293 LBS

## 2025-03-06 NOTE — PROGRESS NOTES
Follow-up Pre-op Bariatric Nutrition Assessment    Surgeon:   Yvan   Patient is considering: RNYGB    ASSESSMENT:  Current weight:   Vitals:    03/06/25 1113   Weight: 133 kg (294 lb)     Ht:      BMI:  Body mass index is 47.45 kg/m².        Initial start weight:   #296  Pre-Op Excess Body Weight (EBW):   #171    Target Post-Op weight goal:  #159-194       Nutrition Interventions for last encounter (date): 1-  Structure meal patterns, eating three meals and 1-2 snacks per day. No skipping meals- Ok to use a protein shake for a meal replacement or need to meal prep. Do not go more than 4-5 hrs with out eating something   2. Drink 64oz of calorie-free, caffeine-free, and non-carbonated beverages.- NO POP, water, SF kristina aid    3. Stop drinking 30 minutes before meals, nothing with meals and wait 30 minutes after meals to drink again. Make meals last 30 minutes-chew thoroughly.   4, Increase physical activity by 10-15 minutes as tolerated to an end goal of 60 minutes 5 x per week. Consistency is the key. Need to start sit down exercises, water aerobics or utube walking videos      Pre-op Goal weight: lose 5% of body weight     Nutrition Monitoring and Evaluation: 1-2 pound weight loss per week  Criteria: weight check  Need for Follow-up: 4 weeks (protein sources)    24 HOUR RECALL/DIET HISTORY:  Breakfast:  hard boiled eggs and yogurt or turkey sausage   Snack:  none   Lunch: skips   Snack: none   Dinner: rice, vegetables and lamb chops or fish, rice and corn   Snack: none  Beverages: water, SF kristina aid   Alcohol: none        READINESS TO LEARN:  Motivation to learn: Interested        Understanding of instruction: Good      Anticipated Compliance: Good        Family Support: U/A           Educational Materials Provided:    None     Nutrition follow-up assessment completed today. Patient is seeking RNYGB. Weight loss of #4 past 2 months. Trying to drink more water and SF kristina aid. Pop is less often. Meals more  consistent with breakfast but admits to now skipping lunch. Re-sent nutrition info. Patient is aware of the need for meal consistency. Educated on protein importance today.     Patient was receptive to nutritional recommendations, asked numerous questions, and verbalized understanding of the weight loss surgery diet.  Patient expressed understanding about the importance of strict dietary compliance post-surgery to avoid nutritional deficiencies and achieve optimal weight loss and verbalized intent to follow dietary recommendations.    Malnutrition Screening:   Significant unintentional weight loss? n/a   Eating less than 75% of usual intake for more than 2 weeks? n/a      Nutrition Diagnosis:   Overweight/obesity related to excess energy intake as evidenced by BMI >= 40 kg/m^2.  Food- and nutrition-related knowledge deficit related to lack of prior exposure to surgical weight loss information as evidenced by pt new to surgical program.    Nutrition Interventions:   Modify type and amount of food and nutrients within meals and snacks.  Comprehensive Nutrition Education    Recommendations:  1.Structure meal patterns, eating three meals and 1-2 snacks per day. No skipping meals- Ok to use a protein shake for a meal replacement or need to meal prep. Do not go more than 4-5 hrs with out eating something   2. Have a good source of protein first at each meal & snack.  Aim for 60-70 grams/day. Eat in this order: protein first, veggies/fruit second and starches last   3. Continue to drink 64oz of calorie-free, caffeine-free, and non-carbonated beverages-water and SF kristina aid .   4. Stop drinking 30 minutes before meals, nothing with meals and wait 30 minutes after meals to drink again. Make meals last 30 minutes-chew thoroughly.   5. Increase physical activity by 10-15 minutes as tolerated to an end goal of 60 minutes 5 x per week. Consistency is the key. Need to start sit down exercises, water aerobics or utube walking  videos     Pre-op Goal weight: lose 5% of body weight    Nutrition Monitoring and Evaluation: 1-2 pound weight loss per week  Criteria: weight check  Need for Follow-up: 4 weeks (vitamins) # 3 of 6     Patient does meet [employer] Health guidelines for weight loss surgery, however needs to demonstrate consistent effort in making dietary changes before giving clearance. It is anticipated that the patient will need at least 3 nutritional follow-up visits prior to clearance for surgery.      Cathy Hernandez RDN, LD

## 2025-03-10 ENCOUNTER — DOCUMENTATION (OUTPATIENT)
Dept: SURGERY | Facility: CLINIC | Age: 45
End: 2025-03-10
Payer: COMMERCIAL

## 2025-03-12 ENCOUNTER — DOCUMENTATION (OUTPATIENT)
Dept: SURGERY | Facility: CLINIC | Age: 45
End: 2025-03-12
Payer: COMMERCIAL

## 2025-03-17 NOTE — PROGRESS NOTES
Pamela Finn female   1980 44 y.o.   [id number]      Chief Complaint  Bilateral breast masses    History Of Present Illness  Pamela Finn is a very pleasant 44 year old AA woman [facility]. She has family history of breast cancer in her paternal great grandmother, maternal great aunt and maternal great grandmother. She denies breast surgery. She had a right breast core biopsy in 2022, benign fibroadenoma.      BREAST IMAGIN2024 Bilateral diagnostic mammogram, indicates BI-RADS Category 3. Stable, probably benign bilateral breast masses warranting one final follow up in one year.     FEMALE HISTORY: menarche age 12, , first birth age 22, did not breastfeed, OCP's x 5 years, menopause age unknown, hysterectomy age 39 due to benign fibroids, ovaries intact, almost entirely fatty tissue     FAMILY CANCER HISTORY:  Maternal Great Aunt: Breast cancer  Paternal Great Grandmother: Breast cancer  Father: Colon cancer, 58  Maternal Great Grandmother: Breast cancer  Maternal Uncle: Stomach cancer/Colon cancer     Surgical History  She has a past surgical history that includes Other surgical history (2019); Breast biopsy (Right); XR knee (Left, ); Cholecystectomy (); Other surgical history (Right, ); Hysterectomy (3/19); and Tubal ligation.     Social History  She reports that she has never smoked. She has never used smokeless tobacco. She reports that she does not currently use alcohol. She reports that she does not use drugs.    Family History  Family History   Problem Relation Name Age of Onset    Hypertension Mother Ivette Ballard     Arthritis Mother Ivette Ballard     Asthma Mother [place] Ballard     Diabetes Mother Ivette Ballard     Stroke Mother [place] Ballard     COPD Mother [place] Ballard     Drug abuse Mother Ivette Ballard     Miscarriages / Stillbirths Mother Ivetteyeimy [place]     Alcohol abuse Father Aman Ballard Sr     Diabetes Father Aman  Elio Sr     Hypertension Father Aman Ballard Sr     Breast cancer Father Aman Ballard Sr     Glaucoma Maternal Grandfather      Breast cancer Other Grandparent         Allergies  Amoxicillin, Animal dander, House dust mite, Mold extracts, Oxycodone, Oxycodone-acetaminophen, Penicillin g, Pollen extracts, and Penicillin    Medications  Current Outpatient Medications   Medication Instructions    azelastine (Optivar) 0.05 % ophthalmic solution Administer into affected eye(s).    buPROPion XL (WELLBUTRIN XL) 150 mg, Daily before breakfast    estradiol (ESTRACE) 0.5 g, vaginal, 3 times weekly, Use a pea-sized amount at the vaginal opening nightly for 2 weeks, then 3 times weekly thereafter.    fluticasone (Flonase) 50 mcg/actuation nasal spray 1 spray, Each Nostril, Daily, Shake gently. Before first use, prime pump. After use, clean tip and replace cap.    meloxicam (MOBIC) 15 mg, oral, Daily    montelukast (SINGULAIR) 10 mg, oral, Daily    pantoprazole (PROTONIX) 40 mg, oral, Daily before breakfast, Do not crush, chew, or split.    sertraline (ZOLOFT) 25 mg, Daily    topiramate (TOPAMAX) 50 mg, oral, 2 times daily         REVIEW OF SYSTEMS    Constitutional:  Negative for appetite change, fatigue, fever and unexpected weight change.   HENT:  Negative for ear pain, hearing loss, nosebleeds, sore throat and trouble swallowing.    Eyes:  Negative for discharge, itching and visual disturbance.   Respiratory:  Negative for cough, chest tightness and shortness of breath.    Cardiovascular:  Negative for chest pain, palpitations and leg swelling.   Breast: as indicated in HPI  Gastrointestinal:  Negative for abdominal pain, constipation, diarrhea and nausea.   Endocrine: Negative for cold intolerance and heat intolerance.   Genitourinary:  Negative for dysuria, frequency, hematuria, pelvic pain and vaginal bleeding.   Musculoskeletal:  Negative for arthralgias, back pain, gait problem, joint swelling and myalgias.    Skin:  Negative for color change and rash.   Allergic/Immunologic: Negative for environmental allergies and food allergies.   Neurological:  Negative for dizziness, tremors, speech difficulty, weakness, numbness and headaches.   Hematological:  Does not bruise/bleed easily.   Psychiatric/Behavioral:  Negative for agitation, dysphoric mood and sleep disturbance. The patient is not nervous/anxious.         Past Medical History  She has a past medical history of Allergic, Allergies, Anxiety, Arthritis, Breast cyst (11/2022), Breast mass (11/22), Depression, GERD (gastroesophageal reflux disease), Migraines, PONV (postoperative nausea and vomiting) (4/19), Rheumatoid arthritis (10/20), Sleep apnea, Stomach disorder, and Visual impairment.     Physical Exam  Patient is alert and oriented x3 and in a relaxed and appropriate mood. Her gait is steady and hand grasps are equal. Sclera is clear. The breasts are nearly symmetrical. The tissue is soft without palpable abnormalities, discrete nodules or masses. The skin and nipples appear normal. There is no cervical, supraclavicular or axillary lymphadenopathy. Heart rate and rhythm normal, S1 and S2 appreciated. The lungs are clear to auscultation bilaterally. Abdomen is soft and non-tender.       Physical Exam     Last Recorded Vitals  Vitals:    03/31/25 1122   BP: 129/86   Pulse: 72   Temp: 36.2 °C (97.2 °F)       Relevant Results   Time was spent viewing digital images of the radiology testing with the patient. I explained the results in depth, along with suggested explanation for follow up recommendations based on the testing results. BI-RADS Category 2    Imaging  Narrative & Impression   Interpreted By:  Louisa Meza,   STUDY:  BI MAMMO BILATERAL DIAGNOSTIC TOMOSYNTHESIS;  3/31/2025 11:06 am      ACCESSION NUMBER(S):  [id number]      ORDERING CLINICIAN:  SHILPA LIVINGSTON      INDICATION:  Patient presents for her annual bilateral mammogram. She also  presents for  a 1 year follow-up of bilateral breast masses. History  of a benign right breast biopsy with pathology reporting  fibroadenoma. Family history of breast cancer with her father  diagnosed.      ,Z12.31 Encounter for screening mammogram for malignant neoplasm of  breast      COMPARISON:  01/02/2024, 10/22/2022      FINDINGS:  MAMMOGRAPHY: 2D and tomosynthesis images were reviewed at 1 mm slice  thickness.      Density:  The breasts are almost entirely fatty.      There are bilateral stable circumscribed masses. There are no  suspicious type masses. No suspicious masses or calcifications are  identified.      IMPRESSION:  No mammographic evidence of malignancy. Bilateral benign masses.      BI-RADS CATEGORY:  BI-RADS Category:  2 Benign.  Recommendation:  Annual Screening.  Recommended Date:  1 Year.  Laterality:  Bilateral.       Assessment/Plan   Normal clinical exam and imaging, bilateral breast masses, stable and benign, history of right breast core biopsy, benign fibroadenoma, family history of breast cancer, almost entirely fatty tissue     Plan: Return to PCP for annual mammograms. Discussed Genetics due to family history, she is interested. Referral placed. She is aware possibly self-pay.     Patient Discussion/Summary  Your clinical examination and imaging are normal. You no longer need to be seen by a breast specialist for an annual physical breast examination. It is important to continue annual screening mammograms and breast exams through your primary care provider. Please return to see me if you have a new breast problem or abnormal mammogram. It has been a pleasure having you as a patient.     You can see your health information, review clinical summaries from office visits & test results online when you follow your health with MY  Chart, a personal health record. To sign up go to [email or web address]. If you need assistance with signing up or trouble getting into your account call [employer]  Patient Line 24/7 at [phone].    My office phone number is [phone] if you need to get in touch with me or have additional questions or concerns. Thank you for choosing [facility] and trusting me as your healthcare provider. I am honored to be a provider on your health care team and I remain dedicated to helping you achieve your health goals.       Taylor Claros, APRN-CNP

## 2025-03-20 ENCOUNTER — TELEPHONE (OUTPATIENT)
Dept: SURGERY | Facility: CLINIC | Age: 45
End: 2025-03-20
Payer: COMMERCIAL

## 2025-03-21 DIAGNOSIS — R51.9 NONINTRACTABLE HEADACHE, UNSPECIFIED CHRONICITY PATTERN, UNSPECIFIED HEADACHE TYPE: ICD-10-CM

## 2025-03-26 DIAGNOSIS — R51.9 NONINTRACTABLE HEADACHE, UNSPECIFIED CHRONICITY PATTERN, UNSPECIFIED HEADACHE TYPE: ICD-10-CM

## 2025-03-26 RX ORDER — TOPIRAMATE 25 MG/1
50 TABLET ORAL 2 TIMES DAILY
Qty: 120 TABLET | Refills: 0 | Status: SHIPPED | OUTPATIENT
Start: 2025-03-26

## 2025-03-27 ENCOUNTER — PROCEDURE VISIT (OUTPATIENT)
Dept: SLEEP MEDICINE | Facility: CLINIC | Age: 45
End: 2025-03-27
Payer: COMMERCIAL

## 2025-03-27 DIAGNOSIS — Z78.9 INTOLERANCE OF CONTINUOUS POSITIVE AIRWAY PRESSURE (CPAP) VENTILATION: ICD-10-CM

## 2025-03-27 DIAGNOSIS — G47.33 OSA ON CPAP: ICD-10-CM

## 2025-03-27 PROCEDURE — 95810 POLYSOM 6/> YRS 4/> PARAM: CPT | Performed by: PSYCHIATRY & NEUROLOGY

## 2025-03-28 VITALS
WEIGHT: 293 LBS | OXYGEN SATURATION: 97 % | HEART RATE: 65 BPM | SYSTOLIC BLOOD PRESSURE: 137 MMHG | HEIGHT: 66 IN | RESPIRATION RATE: 22 BRPM | DIASTOLIC BLOOD PRESSURE: 86 MMHG | BODY MASS INDEX: 47.09 KG/M2

## 2025-03-28 NOTE — PROGRESS NOTES
[facility] TECH NOTE:     Patient: Pamela Finn   MRN//AGE: [id number]  1980  44 y.o.   Technologist: Krysten Oneill   Room: 440B   Service Date: 3/28/2025        Sleep Testing Location: [facility] Sleep lab    [place]: 15, Neck 39CM    TECHNOLOGIST SLEEP STUDY PROCEDURE NOTE:   This sleep study is being conducted according to the policies and procedures outlined by the [employer] accreditation standards.  The sleep study procedure and processes involved during this appointment was explained to the patient/patient’s family, questions were answered. The patient/family verbalized understanding.      The patient is a 44 y.o. year old female scheduled for a PSG  with montage of:  PSG with TCCO2 . She arrived for her appointment.      The study that was ultimately completed was a PSG  with montage of:  PSG with TCCO2 .    The full study Was completed.  Patient questionnaires completed?: yes     Consents signed? yes    Initial Fall Risk Screening:     Pamela has not fallen in the last 6 months. Her fall did not result in injury. Pamela does not have a fear of falling. She does not need assistance with sitting, standing, or walking. She does not need assistance walking in her home. She does not need assistance in an unfamiliar setting. The patient is not using an assistive device.     Brief Study observations: Patient is a 44 year old female, here today for a PSG with TCCO2 study.     Deviation to order/protocol and reason: None      If PAP, which was preferred mask/pressure/mode:       Other: None    After the procedure, the patient/family was informed to ensure followup with ordering clinician for testing results.      Technologist: Krysten Oneill

## 2025-03-31 ENCOUNTER — HOSPITAL ENCOUNTER (OUTPATIENT)
Dept: RADIOLOGY | Facility: CLINIC | Age: 45
Discharge: HOME | End: 2025-03-31
Payer: COMMERCIAL

## 2025-03-31 ENCOUNTER — OFFICE VISIT (OUTPATIENT)
Dept: SURGICAL ONCOLOGY | Facility: CLINIC | Age: 45
End: 2025-03-31
Payer: COMMERCIAL

## 2025-03-31 VITALS
TEMPERATURE: 97.2 F | HEART RATE: 72 BPM | SYSTOLIC BLOOD PRESSURE: 129 MMHG | WEIGHT: 290.79 LBS | DIASTOLIC BLOOD PRESSURE: 86 MMHG | BODY MASS INDEX: 46.73 KG/M2

## 2025-03-31 DIAGNOSIS — Z80.3 FAMILY HISTORY OF BREAST CANCER: Primary | ICD-10-CM

## 2025-03-31 DIAGNOSIS — Z12.31 ENCOUNTER FOR SCREENING MAMMOGRAM FOR MALIGNANT NEOPLASM OF BREAST: ICD-10-CM

## 2025-03-31 PROCEDURE — 77062 BREAST TOMOSYNTHESIS BI: CPT | Mod: BILATERAL PROCEDURE | Performed by: RADIOLOGY

## 2025-03-31 PROCEDURE — 1036F TOBACCO NON-USER: CPT | Performed by: NURSE PRACTITIONER

## 2025-03-31 PROCEDURE — 99213 OFFICE O/P EST LOW 20 MIN: CPT | Performed by: NURSE PRACTITIONER

## 2025-03-31 PROCEDURE — 77066 DX MAMMO INCL CAD BI: CPT

## 2025-03-31 PROCEDURE — 77066 DX MAMMO INCL CAD BI: CPT | Mod: BILATERAL PROCEDURE | Performed by: RADIOLOGY

## 2025-03-31 PROCEDURE — 3074F SYST BP LT 130 MM HG: CPT | Performed by: NURSE PRACTITIONER

## 2025-03-31 PROCEDURE — 3079F DIAST BP 80-89 MM HG: CPT | Performed by: NURSE PRACTITIONER

## 2025-03-31 ASSESSMENT — PATIENT HEALTH QUESTIONNAIRE - PHQ9
SUM OF ALL RESPONSES TO PHQ9 QUESTIONS 1 & 2: 0
2. FEELING DOWN, DEPRESSED OR HOPELESS: NOT AT ALL
1. LITTLE INTEREST OR PLEASURE IN DOING THINGS: NOT AT ALL

## 2025-03-31 ASSESSMENT — PAIN SCALES - GENERAL: PAINLEVEL_OUTOF10: 0-NO PAIN

## 2025-03-31 NOTE — PATIENT INSTRUCTIONS
Your clinical examination and imaging are normal. You no longer need to be seen by a breast specialist for an annual physical breast examination. It is important to continue annual screening mammograms and breast exams through your primary care provider. Please return to see me if you have a new breast problem or abnormal mammogram. It has been a pleasure having you as a patient.     You can see your health information, review clinical summaries from office visits & test results online when you follow your health with MY  Chart, a personal health record. To sign up go to [email or web address]. If you need assistance with signing up or trouble getting into your account call [employer] Patient Line 24/7 at [phone].    My office phone number is [phone] if you need to get in touch with me or have additional questions or concerns. Thank you for choosing [facility] and trusting me as your healthcare provider. I am honored to be a provider on your health care team and I remain dedicated to helping you achieve your health goals.

## 2025-04-01 ENCOUNTER — APPOINTMENT (OUTPATIENT)
Dept: ENDOCRINOLOGY | Facility: CLINIC | Age: 45
End: 2025-04-01
Payer: COMMERCIAL

## 2025-04-01 VITALS — BODY MASS INDEX: 47.09 KG/M2 | HEIGHT: 66 IN | WEIGHT: 293 LBS

## 2025-04-01 DIAGNOSIS — F32.A DEPRESSION, UNSPECIFIED DEPRESSION TYPE: ICD-10-CM

## 2025-04-01 DIAGNOSIS — G47.33 OBSTRUCTIVE SLEEP APNEA SYNDROME: ICD-10-CM

## 2025-04-01 DIAGNOSIS — E66.01 CLASS 3 SEVERE OBESITY WITHOUT SERIOUS COMORBIDITY WITH BODY MASS INDEX (BMI) OF 45.0 TO 49.9 IN ADULT, UNSPECIFIED OBESITY TYPE: Primary | ICD-10-CM

## 2025-04-01 DIAGNOSIS — F41.9 ANXIETY: ICD-10-CM

## 2025-04-01 DIAGNOSIS — E66.813 CLASS 3 SEVERE OBESITY WITHOUT SERIOUS COMORBIDITY WITH BODY MASS INDEX (BMI) OF 45.0 TO 49.9 IN ADULT, UNSPECIFIED OBESITY TYPE: Primary | ICD-10-CM

## 2025-04-01 PROCEDURE — 3008F BODY MASS INDEX DOCD: CPT | Performed by: NURSE PRACTITIONER

## 2025-04-01 PROCEDURE — 1036F TOBACCO NON-USER: CPT | Performed by: NURSE PRACTITIONER

## 2025-04-01 PROCEDURE — 99204 OFFICE O/P NEW MOD 45 MIN: CPT | Performed by: NURSE PRACTITIONER

## 2025-04-01 RX ORDER — TIRZEPATIDE 2.5 MG/.5ML
2.5 INJECTION, SOLUTION SUBCUTANEOUS
Qty: 4 EACH | Refills: 0 | Status: SHIPPED | OUTPATIENT
Start: 2025-04-01

## 2025-04-01 RX ORDER — TOPIRAMATE 25 MG/1
50 TABLET ORAL 2 TIMES DAILY
Qty: 120 TABLET | Refills: 0 | Status: SHIPPED | OUTPATIENT
Start: 2025-04-01

## 2025-04-01 ASSESSMENT — ENCOUNTER SYMPTOMS
POLYPHAGIA: 0
FREQUENCY: 0
PALPITATIONS: 0
CONSTIPATION: 0
DYSPHORIC MOOD: 0
NERVOUS/ANXIOUS: 0
SHORTNESS OF BREATH: 0
FATIGUE: 0
WHEEZING: 0
NAUSEA: 0
POLYDIPSIA: 0
ARTHRALGIAS: 0
NUMBNESS: 0
DEPRESSION: 0
LOSS OF SENSATION IN FEET: 0
OCCASIONAL FEELINGS OF UNSTEADINESS: 0

## 2025-04-01 NOTE — PATIENT INSTRUCTIONS
Nutrition: Have nutrition consult. Please read over Healthy Plate. Start three AM protein shake or a protein and a pice, cottage cheese or Greek yogurt with berries, hard boiled eggs. Have the dietician visit  Physical Exercise: stationary bike, seeing a physical therapist to obtain exercises to do  Medications: See below on medications. Call your insurance to see what weight management medications your plan or pays for weight management medications and find out the ones they cover for in your plan. I entered the Zepbound but if it is not accepted by the plan we use phentermine  We reviewed information on Zepbound Give it weekly, rotate sites every week in the abdomen or the thigh.  It will slow movement of the bowels for feeling of fullness and  you will also have decreased sense of hunger and decreased appetite. If this does not occur at the beginning dose it will occur as we increase the dose. The dose is increased every 4 weeks. Please message me once you take the third dose so we can discuss if we will increase the dose and I can put in another order. Common side effects are GI side effects of nausea and constipation and typically subside over time. Please inform me of intolerable side effects.  Some patients on the GLP1 medications (Ozempic, Mounjaro, Wegovy, Zepboud) can experience nausea and/or constipation. For the nausea I recommend using Tums over the counter first, if this is not effective we can use some Zofran. Avoid highly processed and fried foods. Make sure to avoid constipation by having high fiber foods (veggies, fruit). Make sure to also have enough water with at least 64 ounces per day. If the bowels are moving very slow and you feel constipated use Miralax or Benefiber which you can purchase at a local pharmacy.  Obtain labs as soon as possible  Follow up: Please have the dietitian visit then the  weight management group      [facility]: Obesity Program  Today we discussed  some of the following medications.  If not prescribed already, please look into these medications on your own, and please call your insurance for coverage questions.  If you would like to contact our office with additional questions or a request for a prescription, write us via a Wikisway message, or call [phone].    The following summarizes the medications currently available for weight management:    q    Phentermine (aka Adipex):   This medication is a stimulant and can suppress appetite. Common side effects include an increase in blood pressure (BP) and heart rate (HR), and excessive thirst. You will need to be able to monitor both BP and HR while on this medication. If you have any cardiac issues you may need to contact your cardiologist/PCP to get authorization to take the medication. This medication is a controlled substance in the State of [place]. Per the law, you will need to sign a stimulant consent form when taking Phentermine.  Additionally, you will need to be seen in the office (in person) every 3 months when on this medication.  Please schedule appointments in advance to ensure that we comply with the [place] Law. Phentermine is usually the most cost effective of the appetite suppressants (usually no more than $40/month and can sometimes use GoodRX).  q Qsymia:   This is a combination of two medications: Phentermine and Topamax (aka Topiramate). Topamax is often given for migraine headaches and additionally contributes to appetite suppression.  Since phentermine is part of Qsymia, this medication combination is also considered a controlled substance.  A stimulant consent will need to be signed upon initiation of this medication. Similar to phentermine alone, Qsymia also requires an in person office visit every 3 months.  Schedule visits in advance to comply with the [place] Law.  The cost of the medication depends on the patient's individual insurance but if too costly, the medication can be sent to an  online mail order pharmacy (directions below) for approximately $100/month. [email or web address]  you will need to go to this website, register in the top right hand corner. The online pharmacy will contact you for billing info and our office can electronically send the script to them.   q Contrave:  This medication is a combination of Wellbutrin (aka Buproprion) and Naltrexone. The medication combination helps to decrease appetite and sometimes cravings as well.   Contrave can cause opioid withdrawal.  Patients using chronic opioid therapy should not sure this medication.  Those who use temporary opioid therapy should hold contrave until 14 days after their last opioid dose.  When taking Contrave, a titration process is needed over the first month.   Titration:  For the first week you will take one tablet in the AM,  For the 2nd week you will take one tablet in the AM and one tablet in the PM,   For the 3rd week you will take 2 tablets in the AM and one tablet in the PM, and   For the fourth week (and onward) you will take 2 tablets in the AM and 2 tablets in the PM.     The cost of this medication depends on the individual's insurance. There are 2 options if the medication is not affordable at the retail pharmacy:  It can be sent to a mail order pharmacy that approximates to ~ $100/month.    Mail order pharmacy- [email or web address] You will need to go to this website to register and our office will send an electronic script.  Our office can send each individual medication (Wellbutrin, Naltrexone) to your local   pharmacy.  If sending the two individual medications to your local pharmacy, the prescriptions will appear as: Wellbutrin 150mg tablet taken daily and Naltrexone 50mg- start taking half tablet for one week and then increase to one tablet daily.   q Wegovy:   This medication is a weekly injection (and actually contains the same ingredient as in Ozempic). This medication is FDA  approved for weight loss.  Common side effects include nausea, vomiting, diarrhea or constipation and abdominal pain.  Often these GI side effects resolve with time.  Some patients have shared that injecting the medicine to the thigh area instead of the stomach area helps with the GI side effects.  Do not take this medication if you have a history of pancreatitis.  Additionally, do not take this medication if you or any family members have been diagnosed with Medullary Thyroid Cancer or Multiple Endocrine Neoplasia.  If insurance coverage is poor, retail price for this medication is approximately ~$1200/month.  q Zepbound:   This medication is a weekly injection (and actually contains the same ingredient as in Mounjaro). This medication is FDA approved for weight loss.  Common side effects include nausea, vomiting, diarrhea or constipation and abdominal pain.  Often these GI side effects resolve with time.  Some patients have shared that injecting the medicine to the thigh area instead of the stomach area helps with the GI side effects.  Do not take this medication if you have a history of pancreatitis.  Additionally do not take this medication if you or any family members have been diagnosed with Medullary Thyroid Cancer or Multiple Endocrine Neoplasia.  If insurance coverage is poor, retail price for this medication is approximately ~$1200/month.  q Saxenda:   This medication is a daily injection.  This medication is FDA approved for weight loss.  Common side effects include nausea, vomiting, diarrhea or constipation and abdominal pain.  Often these GI side effects resolve with time.  Some patients have shared that injecting the medicine to the thigh area instead of the stomach area helps with the GI side effects.  Do not take this medication if you have a history of pancreatitis.  Additionally, do not take this medication if you or any family members have been diagnosed with Medullary Thyroid Cancer or Multiple  Endocrine Neoplasia.  If insurance coverage is poor, retail price for this medication is approximately ~$1200/month.        q Ozempic:  This is a diabetes medication and is administered as a weekly injection. It is often NOT covered by insurance unless you are officially diagnosed with type 2 diabetes.  Common side effects include nausea, vomiting, diarrhea or constipation and abdominal pain.  Often these GI side effects resolve with time.  Some patients have shared that injecting the medicine to the thigh area instead of the stomach area helps with the GI side effects.  Do not take this medication if you have a history of pancreatitis.  Additionally, do not take this medication if you or any family members have been diagnosed with Medullary Thyroid Cancer or Multiple Endocrine Neoplasia.  If insurance coverage is poor, retail price for this medication is approximately ~$1200/month.  q Mounjaro:   This is a diabetes medication and is administered as a weekly injection. It is often NOT covered by insurance unless you are officially diagnosed with type 2 diabetes.  Common side effects include nausea, vomiting, diarrhea or constipation and abdominal pain.  Often these GI side effects resolve with time.  Some patients have shared that injecting the medicine to the thigh area instead of the stomach area helps with the GI side effects.  Do not take this medication if you have a history of pancreatitis.  Additionally, do not take this medication if you or any family members have been diagnosed with Medullary Thyroid Cancer or Multiple Endocrine Neoplasia.  If insurance coverage is poor, retail price for this medication is approximately ~$1200/month.      Please always review the official side effect profile for the above medications with the Pharmacist if further questions arise.

## 2025-04-01 NOTE — PROGRESS NOTES
This is a virtual visit where physical exam is limited and ROS and hx is obtained.    Pamela Finn presents for weight loss management and obesity consult today.  Referred by her PCP for weight loss.    PMH: Obesity, Anxiety, depression    She is taking both Zoloft and Wellbutrin for anxiety.  See medication list below      Obesity History:  Childhood or teen obesity history: no  Age of Onset: early adult years  Lowest adult weight: 178  Highest adult weight: 293  Current weight: 293         Biggest challenge with weight management: keeping it off, consistency     History of Weight Loss Efforts: yes  Successful weight loss techniques attempted: self-directed dieting and protein shakes and loss 65 pounds  Unsuccessful weight loss techniques attempted:  phentermine however developed insomnia and stopped after a week    Current typical daily diet:  Typical Breakfast: skip  Typical Lunch: skips a lot of times  Typical Dinner: vegetable, meat, starch  Soda/latte weekly intake: ginger ale daily  Take out/carry out/fast food weekly:1-2 times  Portion sizes/seconds with meals: small    Snacking  Daytime snacks: no  Evening snacks: yes      Describe Appetite (always hungry/no hunger/average): before meals  Are you full after a meal?  yes  Food Noise:  sometimes  Emotional eater? Yes , this is a big issue  Boredom eater? Yes     Current Exercise Habits none, knee issues impacting     Sleep patterns (insomnia, waking in night) /hours of sleep per night: has LUIS ANTONIO not using CPAP  H/O Sleep apnea: yes  Well rested? No     Increased Stressors (describe daily stress): yes      Any intake of an appetite suppressant or an anti-obesity medicine? No     H/O Pancreatitis: no  H/O Thyroid Medullary Cancer: no    Past Medical History:   Diagnosis Date    Allergic     Allergies     seasonal    Anxiety     Arthritis     left knee    Breast cyst 11/2022    Breast mass 11/22    Depression     GERD (gastroesophageal reflux disease)      Migraines     PONV (postoperative nausea and vomiting) 4/19    Rheumatoid arthritis 10/20    Sleep apnea     has CPAP    Stomach disorder     nausea after eating, started 2023    Visual impairment        Current Outpatient Medications   Medication Sig Dispense Refill    azelastine (Optivar) 0.05 % ophthalmic solution Administer into affected eye(s).      estradiol (Estrace) 0.01 % (0.1 mg/gram) vaginal cream Insert 0.125 Applicatorfuls (0.5 g) into the vagina 3 times a week. Use a pea-sized amount at the vaginal opening nightly for 2 weeks, then 3 times weekly thereafter. 42.5 g 3    fluticasone (Flonase) 50 mcg/actuation nasal spray ADMINISTER 1 SPRAY INTO EACH NOSTRIL ONCE DAILY. SHAKE GENTLY. BEFORE FIRST USE, PRIME PUMP. AFTER USE, CLEAN TIP AND REPLACE CAP. 48 mL 4    meloxicam (Mobic) 15 mg tablet TAKE 1 TABLET BY MOUTH EVERY DAY 30 tablet 1    montelukast (Singulair) 10 mg tablet TAKE 1 TABLET BY MOUTH EVERY DAY 90 tablet 3    pantoprazole (ProtoNix) 40 mg EC tablet Take 1 tablet (40 mg) by mouth once daily in the morning. Take before meals. Do not crush, chew, or split. 90 tablet 3    sertraline (Zoloft) 25 mg tablet Take 1 tablet (25 mg) by mouth once daily.      tirzepatide, weight loss, (Zepbound) 2.5 mg/0.5 mL injection Inject 2.5 mg under the skin every 7 days. 4 each 0    topiramate (Topamax) 25 mg tablet TAKE 2 TABLETS BY MOUTH TWICE A  tablet 0     No current facility-administered medications for this visit.           Review of Systems  Review of Systems   Constitutional:  Negative for fatigue.   Respiratory:  Negative for shortness of breath and wheezing.    Cardiovascular:  Negative for chest pain and palpitations.   Gastrointestinal:  Negative for constipation and nausea.   Endocrine: Negative for polydipsia, polyphagia and polyuria.   Genitourinary:  Negative for frequency and urgency.   Musculoskeletal:  Negative for arthralgias.   Skin:  Negative for rash.   Neurological:  Negative for  "numbness.   Psychiatric/Behavioral:  Negative for dysphoric mood. The patient is not nervous/anxious.            Objective   Ht 1.676 m (5' 6\")   Wt 133 kg (293 lb) Comment: per patient  BMI 47.29 kg/m²     Physical Exam  Physical Exam  Constitutional:       Appearance: She is obese.   Neurological:      Mental Status: She is alert and oriented to person, place, and time.   Psychiatric:         Mood and Affect: Mood normal.         Behavior: Behavior normal.         Thought Content: Thought content normal.         Judgment: Judgment normal.         Lab Review  Lab Results   Component Value Date    HGBA1C 5.3 06/21/2024     Lab Results   Component Value Date    LDLCALC 105 (H) 06/21/2024       Weight Trends  Trends of weight reviewed in visit, see graph below:  Wt Readings from Last 3 Encounters:   04/01/25 133 kg (293 lb)   03/31/25 132 kg (290 lb 12.6 oz)   03/27/25 133 kg (293 lb 3.4 oz)     Assessment/Plan     Follow up and Goals:  Nutrition: Have nutrition consult. Please read over Healthy Plate. Start three AM protein shake or a protein and a pice, cottage cheese or Greek yogurt with berries, hard boiled eggs. Have the dietician visit  Physical Exercise: stationary bike, seeing a physical therapist to obtain exercises to do  Medications: See below on medications. Call your insurance to see what weight management medications your plan or pays for weight management medications and find out the ones they cover for in your plan. I entered the Zepbound but if it is not accepted by the plan we use phentermine  Obtain labs as soon as possible  Follow up: Please have the dietitian visit then the  weight management group      Visit length of 45 minutes      Problem List Items Addressed This Visit       Obstructive sleep apnea syndrome    Relevant Medications    tirzepatide, weight loss, (Zepbound) 2.5 mg/0.5 mL injection    BMI 45.0-49.9, adult (Multi)    Class 3 severe obesity without serious comorbidity with body " mass index (BMI) of 45.0 to 49.9 in adult - Primary    Relevant Orders    Referral to Nutrition Services    Follow Up In Endocrinology    Depression    Anxiety       Diet interventions: referral to dietitian for guidance in these changes  Discussed Mediterranean Diet, given pamphlet or it will be mailed, we looked at ADA plate, portion sizes, recipes. Advised to review in preparation for nutrition consult    Handouts given:  yes    Exercise intervention:   We discussed the importance of incorporating resistance and free weight  lifting into physical activity routine to prevent muscle wasting with weight loss, enhance bone health, the positive role increased muscle has in burning fat at rest. We looked at examples of these types of exercise routines online. Advised to do modifications. Advised to check with PCP if there is a h/o musculoskeletal injury or hx. We discussed benefits of walking with weight loss and as a cardio form of exercise. Gradually increase exercise to a goal of 150 minutes at least per week.    Follow Up:  Referred to nutrition or continue to work with current dietitian   Discussed obesity medications, side effects and mechanism of action reviewed with patient  Discussed physical activity: we reviewed [employer] videos for strength training, advised to use modifications for these videos, we discussed benefits of strength training and resistance bands  on bone and muscle health, we discussed walking and water aerobic options for cardio  Discussed Mediterranean Diet, given pamphlet or it will be mailed, we looked at ADA plate, portion sizes, recipes. Advised to review Mediterranean Meal Plan booklet  in preparation for nutrition consult  ....  1 month group visit and nutrition visit in person or  virtual  Please reach out if you need anything or have further questions

## 2025-04-06 ENCOUNTER — DOCUMENTATION (OUTPATIENT)
Dept: SURGERY | Facility: CLINIC | Age: 45
End: 2025-04-06
Payer: COMMERCIAL

## 2025-04-10 ENCOUNTER — NUTRITION (OUTPATIENT)
Dept: SURGERY | Facility: CLINIC | Age: 45
End: 2025-04-10
Payer: COMMERCIAL

## 2025-04-10 VITALS — BODY MASS INDEX: 47.29 KG/M2 | WEIGHT: 293 LBS

## 2025-04-10 NOTE — PROGRESS NOTES
PREOPERATIVE, MULTIDISCIPLINARY, MEDICALLY SUPERVISED, REDUCED CALORIE DIET, BEHAVIOR MODIFICATION AND EXERCISE PROGRAM    S:  Today is #2 of 6. Wants to try weight loss meds first but still continue on the surgery pathway just in case meds don't work/insurance coverage.  Main fluid is water, SF kristina aid, red bull, cut out the pop now. Had to change mental health meds- found they were making her tried so started to drink the red bull. Will talk to provider.  Meals are now more consistent- three meals a day. Not using a protein shake yet. Patient arrived 15 minutes late to appt- completed a [facility] appt today. F/U in May for #3 of 6 and will complete a nutrition F/U     O:    Wt: #293    Goal: 5% body weight loss over the course of program    Dietary recommendation:   1. Eliminate high calorie, carbonated, and caffeinated beverages  2. Practice the 30-30-30 rule by drinking between meals.  3. Structure your meal plan - have 3 meals and 1 snack daily.  4. Have balanced meals that always contain a good source of protein.  5. Increase intake of non-starchy vegetables.  Have 5 servings fruits and vegetables daily.   6. Take a multivitamin daily.  7. Increase physical activity by 10-15 minutes to an end goal of 60 minutes 5 x per week.    Group Topic: Get Movin'    Behavioral recommendation: Pt is encouraged to identify and try different types of physical activity.    A/P: Pt appears to have a good understanding of how to incorporate physical activity into their daily schedule.  Pt has a goal to begin making small time commitments each week to fit in 30 minutes of exercise on as many days as possible.    Exercise: Low- has not started yet. Increase physical activity by 10-15 minutes as tolerated to an end goal of 60 minutes 5 x per week. Consistency is the key. Need to start sit down exercises, water aerobics or utube walking videos      Cathy Hernandez, THOMAS, LD

## 2025-04-11 ENCOUNTER — TELEPHONE (OUTPATIENT)
Dept: ENDOCRINOLOGY | Facility: CLINIC | Age: 45
End: 2025-04-11
Payer: COMMERCIAL

## 2025-04-17 ENCOUNTER — OFFICE VISIT (OUTPATIENT)
Dept: URGENT CARE | Age: 45
End: 2025-04-17
Payer: COMMERCIAL

## 2025-04-17 VITALS
BODY MASS INDEX: 47.29 KG/M2 | TEMPERATURE: 98.1 F | DIASTOLIC BLOOD PRESSURE: 88 MMHG | HEART RATE: 71 BPM | SYSTOLIC BLOOD PRESSURE: 141 MMHG | WEIGHT: 293 LBS | OXYGEN SATURATION: 97 % | RESPIRATION RATE: 16 BRPM

## 2025-04-17 DIAGNOSIS — S33.5XXA LUMBAR SPRAIN, INITIAL ENCOUNTER: ICD-10-CM

## 2025-04-17 DIAGNOSIS — S46.812A STRAIN OF LEFT TRAPEZIUS MUSCLE, INITIAL ENCOUNTER: Primary | ICD-10-CM

## 2025-04-17 LAB — TSH SERPL-ACNC: 1.41 MIU/L

## 2025-04-17 RX ORDER — KETOROLAC TROMETHAMINE 30 MG/ML
30 INJECTION, SOLUTION INTRAMUSCULAR; INTRAVENOUS ONCE
Status: COMPLETED | OUTPATIENT
Start: 2025-04-17 | End: 2025-04-17

## 2025-04-17 RX ORDER — NAPROXEN 375 MG/1
375 TABLET ORAL 2 TIMES DAILY PRN
Qty: 20 TABLET | Refills: 0 | Status: SHIPPED | OUTPATIENT
Start: 2025-04-17 | End: 2025-04-27

## 2025-04-17 RX ORDER — CYCLOBENZAPRINE HCL 10 MG
10 TABLET ORAL 3 TIMES DAILY PRN
Qty: 30 TABLET | Refills: 0 | Status: SHIPPED | OUTPATIENT
Start: 2025-04-17 | End: 2025-06-16

## 2025-04-17 RX ADMIN — KETOROLAC TROMETHAMINE 30 MG: 30 INJECTION, SOLUTION INTRAMUSCULAR; INTRAVENOUS at 10:09

## 2025-04-17 ASSESSMENT — PAIN SCALES - GENERAL: PAINLEVEL_OUTOF10: 7

## 2025-04-17 NOTE — LETTER
April 17, 2025     Patient: Pmaela Finn   YOB: 1980   Date of Visit: 4/17/2025       To Whom It May Concern:    Pamela Finn was seen in my clinic on 4/17/2025 at 9:55 am. Please excuse Pamela for her absence from work on this day to make the appointment. She can return to work on 4/21/2025.    If you have any questions or concerns, please don't hesitate to call.         Sincerely,         Rosario Vaca MD        CC: No Recipients

## 2025-04-17 NOTE — PROGRESS NOTES
HPI:  Patient states that for the past 2 weeks she notice pain on the left side of her neck with radiation to the shoulder and in the left side of lower back.  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  Pt states that she did not fall, but shortly after started to feel pain in her left lower back. Pt denies numbness/weakness in UE/LE.  Pt states that pain is 7/10 in severity.      ROS:  No numbness/weakness in uE/LE  No bowel/bladder trouble  No HA  No dizziness     PE:    A&O x3  NCAT  PERRLA, EOMI  TM clear bl  No pharyngeal erythema  RRR  CTAB  +tndop over left trapez  +tndop over lumbar paraspinous muscles  FROM in the neck  FROM in the LUE  +pain with back rotation  No focal deficit  Judgement normal    A/P:   Trapezius strain  Lumbar strain    Do not take Meloxicam.  Ice.  Stretching.  Rest. Topical patches/Biofreeze.  Follow up with your doctor for further evaluation in 2 weeks if symptoms persist.  Keep a diary of symptoms.  Go to the ER if starts getting worse.

## 2025-04-18 LAB
25(OH)D3+25(OH)D2 SERPL-MCNC: 31 NG/ML (ref 30–100)
ALBUMIN SERPL-MCNC: 4 G/DL (ref 3.6–5.1)
ALP SERPL-CCNC: 110 U/L (ref 31–125)
ALT SERPL-CCNC: 12 U/L (ref 6–29)
ANION GAP SERPL CALCULATED.4IONS-SCNC: 8 MMOL/L (CALC) (ref 7–17)
AST SERPL-CCNC: 15 U/L (ref 10–30)
BASOPHILS # BLD AUTO: 48 CELLS/UL (ref 0–200)
BASOPHILS NFR BLD AUTO: 0.8 %
BILIRUB SERPL-MCNC: 0.5 MG/DL (ref 0.2–1.2)
BUN SERPL-MCNC: 11 MG/DL (ref 7–25)
CALCIUM SERPL-MCNC: 9.1 MG/DL (ref 8.6–10.2)
CHLORIDE SERPL-SCNC: 107 MMOL/L (ref 98–110)
CHOLEST SERPL-MCNC: 191 MG/DL
CHOLEST/HDLC SERPL: 2.7 (CALC)
CO2 SERPL-SCNC: 26 MMOL/L (ref 20–32)
CREAT SERPL-MCNC: 0.84 MG/DL (ref 0.5–0.99)
EGFRCR SERPLBLD CKD-EPI 2021: 89 ML/MIN/1.73M2
EOSINOPHIL # BLD AUTO: 102 CELLS/UL (ref 15–500)
EOSINOPHIL NFR BLD AUTO: 1.7 %
ERYTHROCYTE [DISTWIDTH] IN BLOOD BY AUTOMATED COUNT: 14.4 % (ref 11–15)
EST. AVERAGE GLUCOSE BLD GHB EST-MCNC: 114 MG/DL
EST. AVERAGE GLUCOSE BLD GHB EST-SCNC: 6.3 MMOL/L
GLUCOSE SERPL-MCNC: 96 MG/DL (ref 65–99)
HBA1C MFR BLD: 5.6 %
HCT VFR BLD AUTO: 41.7 % (ref 35–45)
HDLC SERPL-MCNC: 71 MG/DL
HGB BLD-MCNC: 13.4 G/DL (ref 11.7–15.5)
LDLC SERPL CALC-MCNC: 107 MG/DL (CALC)
LYMPHOCYTES # BLD AUTO: 1548 CELLS/UL (ref 850–3900)
LYMPHOCYTES NFR BLD AUTO: 25.8 %
MCH RBC QN AUTO: 27.6 PG (ref 27–33)
MCHC RBC AUTO-ENTMCNC: 32.1 G/DL (ref 32–36)
MCV RBC AUTO: 85.8 FL (ref 80–100)
MONOCYTES # BLD AUTO: 414 CELLS/UL (ref 200–950)
MONOCYTES NFR BLD AUTO: 6.9 %
NEUTROPHILS # BLD AUTO: 3888 CELLS/UL (ref 1500–7800)
NEUTROPHILS NFR BLD AUTO: 64.8 %
NONHDLC SERPL-MCNC: 120 MG/DL (CALC)
PLATELET # BLD AUTO: 442 THOUSAND/UL (ref 140–400)
PMV BLD REES-ECKER: 9.4 FL (ref 7.5–12.5)
POTASSIUM SERPL-SCNC: 4.2 MMOL/L (ref 3.5–5.3)
PROT SERPL-MCNC: 7.3 G/DL (ref 6.1–8.1)
RBC # BLD AUTO: 4.86 MILLION/UL (ref 3.8–5.1)
SODIUM SERPL-SCNC: 141 MMOL/L (ref 135–146)
TRIGL SERPL-MCNC: 52 MG/DL
WBC # BLD AUTO: 6 THOUSAND/UL (ref 3.8–10.8)

## 2025-04-22 ENCOUNTER — TELEMEDICINE CLINICAL SUPPORT (OUTPATIENT)
Dept: ENDOCRINOLOGY | Facility: CLINIC | Age: 45
End: 2025-04-22
Payer: COMMERCIAL

## 2025-04-22 VITALS — WEIGHT: 293 LBS | BODY MASS INDEX: 47.09 KG/M2 | HEIGHT: 66 IN

## 2025-04-22 DIAGNOSIS — E66.01 CLASS 3 SEVERE OBESITY WITHOUT SERIOUS COMORBIDITY WITH BODY MASS INDEX (BMI) OF 45.0 TO 49.9 IN ADULT, UNSPECIFIED OBESITY TYPE: ICD-10-CM

## 2025-04-22 DIAGNOSIS — Z71.3 DIETARY COUNSELING: Primary | ICD-10-CM

## 2025-04-22 DIAGNOSIS — E66.813 CLASS 3 SEVERE OBESITY WITHOUT SERIOUS COMORBIDITY WITH BODY MASS INDEX (BMI) OF 45.0 TO 49.9 IN ADULT, UNSPECIFIED OBESITY TYPE: ICD-10-CM

## 2025-04-22 NOTE — PATIENT INSTRUCTIONS
- Please refer to your book entitled: Your Mediterranean Meal Plan, and follow Mediterranean Diet eating guidelines as reviewed.  - The Healthy Plate style of eating can be a helpful tool for incorporating healthy balanced meals in appropriate portions. (Healthy Plate: Start with a 9-inch diameter plate. Fill 1/2 the plate with non-starchy vegetables, 1/4 of the plate with whole grains or starchy vegetables, and 1/4 of the plate with a lean source of protein.   - Please aim for a source of healthy protein and fiber rich foods at meals as discussed for nutrition needs as well as to help provide better satiety at meals. Have a protein shake and high fiber fruit with breakfast as discussed.   - Consider pre-planning healthy meals for the week. Refer to your book for both menu and recipe ideas to get you started.  - Incorporate strategies of mindful eating every day. Practice staying in tune with your body's hunger cues and eat only when truly hungry. Avoid emotional eating/eating when not hungry.  - Aim for 64 ounces of water daily.  - Consider checking in with MD to see PT is still an options. Try water aerobics or chair exercise/yoga to increase movement. Resistance training is encouraged at least twice weekly.  - Follow-up as scheduled for the group classes with HAJA Gutiérrez.  - Follow-up with nutrition in 4 weeks.

## 2025-04-22 NOTE — PROGRESS NOTES
"Initial Nutrition Assessment    Patient was referred to nutrition by HAJA Gutiérrez  for weight management/desire to lose weight, as well as for education. Other PMHX significant for LUIS ANTONIO, osteoarthritis of left knee, HTN, anxiety, depression, and dysmetabolic syndrome. Pertinent labs reviewed which show A1C 5.6% and elevated LDL.     Diet recall reveals an inconsistent meal pattern with frequently missed meals, as well as reported intakes of larger portions and calorically dense foods all likely contributing to lack of desired weight loss/contributing to weight gain over time. Fluids meeting recommendations in type and amount with water as primary beverage. Pt is not incorporating consistent physical activity at this time and would likely benefit from increased structured activity to assist in achieving goals.     See all interventions/recommendations below as discussed during visit this day.     Patient reported symptoms: Difficulty losing weight    Anthropometrics:  Height:   Ht Readings from Last 1 Encounters:   04/22/25 1.676 m (5' 6\")      Weight:   Wt Readings from Last 10 Encounters:   04/22/25 133 kg (293 lb)   04/17/25 133 kg (293 lb)   04/10/25 133 kg (293 lb)   04/01/25 133 kg (293 lb)   03/31/25 132 kg (290 lb 12.6 oz)   03/27/25 133 kg (293 lb 3.4 oz)   03/06/25 133 kg (294 lb)   03/04/25 134 kg (294 lb 9.6 oz)   01/29/25 134 kg (296 lb)   01/29/25 134 kg (296 lb)      Current BMI:   BMI Readings from Last 1 Encounters:   04/22/25 47.29 kg/m²        Labs:  Lab Results   Component Value Date    HGBA1C 5.6 04/17/2025      Lab Results   Component Value Date    CHOL 191 04/17/2025    CHOL 176 06/21/2024    CHOL 177 10/14/2023     Lab Results   Component Value Date    HDL 71 04/17/2025    HDL 60.6 06/21/2024    HDL 55.3 10/14/2023     Lab Results   Component Value Date    LDLCALC 107 (H) 04/17/2025    LDLCALC 105 (H) 06/21/2024    LDLCALC 112 (H) 10/14/2023     Lab Results   Component Value Date    " TRIG 52 04/17/2025    TRIG 51 06/21/2024    TRIG 51 10/14/2023       Malnutrition Screening:  Significant Unintentional weight loss: No  Eating less than 75% of usual intake for more than 2 weeks: No  Potential Signs of Inflammation: No    Recommended Malnutrition Diagnosis: No malnutrition identified    Diet Recall-  Breakfast- Skips  Lunch- shrimp and cabbage or [place] sub roastbeef with italian bread and ruffles chips and    Dinner- Pint of stef and baltazar's ice cream or skip or steak   Daily Snacks- chips/ice cream   Beverages- 64 oz water, 12 oz gingerale or redbull every now and then     Alcohol- none    Physical Activity- none     Other pertinent patient reported Information:  - Past weight loss attempts include: lost 65 lbs drinking 2 premier protein shakes per day but she got tired of this and gained the weight back.   - Zepbound did not get approved through insurance, will try phentermine, advised pt to contact Yamel Shruthi   - Has a gym membership and can work with PT now that schedule is more open     Nutrition Diagnosis: Overweight/Obesity related to food-and-nutrition-related knowledge deficit as evidenced by BMI above normative standard for age and gender.    Readiness to Learn:  Cognitive ability: Alert and oriented  Motivation to learn: Eager and Interested  Family Support: Unable to assess- family not present  Instruction provided to: Patient  Patient learns best by: Multiple methods  Factors affecting learning: None   Physical limitations affecting learning: None    Education Materials Provided:   Your Mediterranean Meal Plan Booklet    Nutrition Interventions/Recommendations for 4/22/2025:  - Please refer to your book entitled: Your Mediterranean Meal Plan, and follow Mediterranean Diet eating guidelines as reviewed.  - The Healthy Plate style of eating can be a helpful tool for incorporating healthy balanced meals in appropriate portions. (Healthy Plate: Start with a 9-inch diameter plate. Fill  1/2 the plate with non-starchy vegetables, 1/4 of the plate with whole grains or starchy vegetables, and 1/4 of the plate with a lean source of protein.   - Please aim for a source of healthy protein and fiber rich foods at meals as discussed for nutrition needs as well as to help provide better satiety at meals. Have a protein shake and high fiber fruit with breakfast as discussed.   - Consider pre-planning healthy meals for the week. Refer to your book for both menu and recipe ideas to get you started.  - Incorporate strategies of mindful eating every day. Practice staying in tune with your body's hunger cues and eat only when truly hungry. Avoid emotional eating/eating when not hungry.  - Aim for 64 ounces of water daily.  - Consider checking in with MD to see PT is still an options. Try water aerobics or chair exercise/yoga to increase movement. Resistance training is encouraged at least twice weekly.  - Follow-up as scheduled for the group classes with HAJA Gutiérrez.  - Follow-up with nutrition in 4 weeks.      Nutrition Monitoring & Evaluation: adherence to recommendations and patient stated goals    Need for follow-up: Individual Nutrition Visit in 4-6 weeks and As scheduled for HAJA Gutiérrez Shared Medical Appointment ([facility])    Referred by: HAJA Gutiérrez     MNT Billing Type: Medical Nutrition Assessment, each 15 min increment, for 3 increments.    SIGNATURE:   Kate Raymond MS, RD, LD                                                      DATE:   4/22/2025

## 2025-04-24 DIAGNOSIS — E66.813 CLASS 3 SEVERE OBESITY WITHOUT SERIOUS COMORBIDITY IN ADULT: Primary | ICD-10-CM

## 2025-04-29 ENCOUNTER — APPOINTMENT (OUTPATIENT)
Dept: NUTRITION | Facility: HOSPITAL | Age: 45
End: 2025-04-29
Payer: COMMERCIAL

## 2025-04-30 RX ORDER — PHENTERMINE HYDROCHLORIDE 37.5 MG/1
37.5 TABLET ORAL
Qty: 30 TABLET | Refills: 0 | Status: SHIPPED | OUTPATIENT
Start: 2025-05-01 | End: 2025-05-31

## 2025-05-13 ENCOUNTER — APPOINTMENT (OUTPATIENT)
Dept: ENDOCRINOLOGY | Facility: CLINIC | Age: 45
End: 2025-05-13
Payer: COMMERCIAL

## 2025-05-15 ENCOUNTER — OFFICE VISIT (OUTPATIENT)
Dept: ORTHOPEDIC SURGERY | Facility: CLINIC | Age: 45
End: 2025-05-15
Payer: COMMERCIAL

## 2025-05-15 DIAGNOSIS — M17.12 PRIMARY OSTEOARTHRITIS OF LEFT KNEE: ICD-10-CM

## 2025-05-15 PROCEDURE — 2500000004 HC RX 250 GENERAL PHARMACY W/ HCPCS (ALT 636 FOR OP/ED): Performed by: ORTHOPAEDIC SURGERY

## 2025-05-15 PROCEDURE — 20610 DRAIN/INJ JOINT/BURSA W/O US: CPT | Mod: LT | Performed by: ORTHOPAEDIC SURGERY

## 2025-05-15 PROCEDURE — 99213 OFFICE O/P EST LOW 20 MIN: CPT | Performed by: ORTHOPAEDIC SURGERY

## 2025-05-15 RX ORDER — TRIAMCINOLONE ACETONIDE 40 MG/ML
40 INJECTION, SUSPENSION INTRA-ARTICULAR; INTRAMUSCULAR
Status: COMPLETED | OUTPATIENT
Start: 2025-05-15 | End: 2025-05-15

## 2025-05-15 RX ORDER — LIDOCAINE HYDROCHLORIDE 10 MG/ML
5 INJECTION, SOLUTION INFILTRATION; PERINEURAL
Status: COMPLETED | OUTPATIENT
Start: 2025-05-15 | End: 2025-05-15

## 2025-05-15 RX ADMIN — LIDOCAINE HYDROCHLORIDE 5 ML: 10 INJECTION, SOLUTION INFILTRATION; PERINEURAL at 12:13

## 2025-05-15 RX ADMIN — TRIAMCINOLONE ACETONIDE 40 MG: 400 INJECTION, SUSPENSION INTRA-ARTICULAR; INTRAMUSCULAR at 12:13

## 2025-05-15 NOTE — LETTER
May 15, 2025     Patient: Pamela Finn   YOB: 1980   Date of Visit: 5/15/2025       To Whom It May Concern:    Pamela Finn was evaluated in the office today.      If you have any questions or concerns, please don't hesitate to call.         Sincerely,        Roland Patino MD    CC: No Recipients

## 2025-05-15 NOTE — PROGRESS NOTES
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  She had cortisone shots in the past which did help slightly but she had Visco injections last time which did not help at all.  She is currently involved with the bariatric team about her weight but her BMI is currently 47.    Her exam reveals her knee comes out to full flexion and full extension she has tenderness of the medial joint line with negative Shelby's sign no medial lateral instability.  Her hip moves well.  Difficult to tell if she has a significant joint effusion.  Full motor strength.    Your x-rays which she previously had done standing hip to ankle show medial axis deviation of both knees left worse than right with medial joint space narrowing on the left side not so much on the right.    Assessment medial joint arthritis left knee and 44-year-old patient with morbid obesity not a candidate for knee replacement at this time.    Plan I have talked to her about the risks and benefits of doing another corticosteroid injection into the left knee and she agreed to proceed today.  See note below.  I also put her into 10 sessions of physical therapy to try to strengthen the left and knee.    To be brutally honest I told her I think she should go ahead and have the bariatric surgery.  I think just losing the weight will help her knee pain and then she will not need a knee replacement.  If she loses to a point where her BMI is under 40 then she may be a candidate for left knee replacement albeit she is very young and her arthritic condition by x-ray does not appear to be severe at this time.Patient ID: Pamela Finn is a 44 y.o. female.    L Inj/Asp: L knee on 5/15/2025 12:13 PM  Details: 21 G needle, anteromedial approach  Medications: 40 mg triamcinolone acetonide 40 mg/mL; 5 mL lidocaine 10 mg/mL (1 %)

## 2025-05-20 ENCOUNTER — APPOINTMENT (OUTPATIENT)
Dept: ENDOCRINOLOGY | Facility: CLINIC | Age: 45
End: 2025-05-20
Payer: COMMERCIAL

## 2025-05-22 ENCOUNTER — APPOINTMENT (OUTPATIENT)
Dept: SLEEP MEDICINE | Facility: CLINIC | Age: 45
End: 2025-05-22
Payer: COMMERCIAL

## 2025-05-22 ENCOUNTER — NUTRITION (OUTPATIENT)
Dept: SURGERY | Facility: CLINIC | Age: 45
End: 2025-05-22
Payer: COMMERCIAL

## 2025-05-22 NOTE — PROGRESS NOTES
PREOPERATIVE, MULTIDISCIPLINARY, MEDICALLY SUPERVISED, REDUCED CALORIE DIET, BEHAVIOR MODIFICATION AND EXERCISE PROGRAM    S:  Just started weight loss pill via endo. Is still deciding between weight loss meds vrs surgery. Is drinking water- at least 64oz a day.  No red bull. Is using protein shake (PP mixed with almond milk). Using these protein shakes 3-4 times a week for a meal replacement. Meals remain consistent. Focusing on more protein based foods at the meals. Today is 3 of 6. Did a MS[facility] session since 15 minutes late to appt.   Will F/U in June (after school is out) for a F/U.     O:    Wt: U/A    Goal: 5% body weight loss over the course of program    Dietary recommendation:   1. Eliminate high calorie, carbonated, and caffeinated beverages  2. Practice the 30-30-30 rule by drinking between meals.  3. Structure your meal plan - have 3 meals and 1 snack daily.  4. Have balanced meals that always contain a good source of protein.  5. Increase intake of non-starchy vegetables.  Have 5 servings fruits and vegetables daily.   6. Take a multivitamin daily.  7. Increase physical activity by 10-15 minutes to an end goal of 60 minutes 5 x per week.    Group Topic: Put Your Beverage to the Test  Behavioral recommendation: Pt is encouraged to identify appropriate beverages that can be tolerated post weight loss surgery.    A/P: Pt appears to have a good understanding of how to choose beverages that are appropriate for the weight loss surgery patient.  Pt has a goal to consume a minimum of 8 cups of sugar free, decaffeinated, non-carbonated beverages daily    Exercise: Low- recent knee shot 4/27 also referral to PT. Looking in water aerobics at [facility]     Cathy Hernandez, THOMAS, LD

## 2025-05-28 ENCOUNTER — APPOINTMENT (OUTPATIENT)
Dept: ENDOCRINOLOGY | Facility: CLINIC | Age: 45
End: 2025-05-28
Payer: COMMERCIAL

## 2025-06-02 ENCOUNTER — DOCUMENTATION (OUTPATIENT)
Dept: SURGERY | Facility: CLINIC | Age: 45
End: 2025-06-02
Payer: COMMERCIAL

## 2025-06-02 DIAGNOSIS — E66.813 CLASS 3 SEVERE OBESITY WITHOUT SERIOUS COMORBIDITY IN ADULT: ICD-10-CM

## 2025-06-03 RX ORDER — PHENTERMINE HYDROCHLORIDE 37.5 MG/1
37.5 TABLET ORAL
Qty: 30 TABLET | Refills: 0 | Status: SHIPPED | OUTPATIENT
Start: 2025-06-03 | End: 2025-07-03

## 2025-06-20 ENCOUNTER — APPOINTMENT (OUTPATIENT)
Dept: OPHTHALMOLOGY | Facility: CLINIC | Age: 45
End: 2025-06-20
Payer: COMMERCIAL

## 2025-06-20 DIAGNOSIS — Z00.00 ROUTINE GENERAL MEDICAL EXAMINATION AT A HEALTH CARE FACILITY: ICD-10-CM

## 2025-06-20 DIAGNOSIS — H52.7 REFRACTION ERROR: ICD-10-CM

## 2025-06-20 DIAGNOSIS — H25.13 AGE-RELATED NUCLEAR CATARACT OF BOTH EYES: Primary | ICD-10-CM

## 2025-06-20 ASSESSMENT — SLIT LAMP EXAM - LIDS
COMMENTS: NORMAL
COMMENTS: NORMAL

## 2025-06-20 ASSESSMENT — REFRACTION_WEARINGRX
OD_AXIS: 164
OD_CYLINDER: +1.75
OS_AXIS: 006
OD_SPHERE: +2.25
OS_SPHERE: +0.25
OS_CYLINDER: +1.00

## 2025-06-20 ASSESSMENT — ENCOUNTER SYMPTOMS
EYES NEGATIVE: 0
ALLERGIC/IMMUNOLOGIC NEGATIVE: 0
ENDOCRINE NEGATIVE: 0
CARDIOVASCULAR NEGATIVE: 0
MUSCULOSKELETAL NEGATIVE: 0
RESPIRATORY NEGATIVE: 0
NEUROLOGICAL NEGATIVE: 0
HEMATOLOGIC/LYMPHATIC NEGATIVE: 0
PSYCHIATRIC NEGATIVE: 0
CONSTITUTIONAL NEGATIVE: 0
GASTROINTESTINAL NEGATIVE: 0

## 2025-06-20 ASSESSMENT — VISUAL ACUITY
OS_SC: 20/30
OD_SC: 20/70
OS_PH_SC+: -3
METHOD: SNELLEN - LINEAR
OD_PH_SC: 20/30
OD_SC+: -1
OS_PH_SC: 20/20

## 2025-06-20 ASSESSMENT — REFRACTION_MANIFEST
OS_AXIS: 005
OD_CYLINDER: +0.50
OS_SPHERE: +1.50
OS_CYLINDER: +0.25
OD_SPHERE: +1.75
OD_AXIS: 165
OD_ADD: +1.00
OS_ADD: +1.00

## 2025-06-20 ASSESSMENT — CONF VISUAL FIELD
OD_INFERIOR_NASAL_RESTRICTION: 0
OS_INFERIOR_TEMPORAL_RESTRICTION: 0
OD_NORMAL: 1
METHOD: COUNTING FINGERS
OS_INFERIOR_NASAL_RESTRICTION: 0
OD_INFERIOR_TEMPORAL_RESTRICTION: 0
OD_SUPERIOR_NASAL_RESTRICTION: 0
OS_SUPERIOR_TEMPORAL_RESTRICTION: 0
OS_NORMAL: 1
OD_SUPERIOR_TEMPORAL_RESTRICTION: 0
OS_SUPERIOR_NASAL_RESTRICTION: 0

## 2025-06-20 ASSESSMENT — TONOMETRY
OS_IOP_MMHG: 12
OD_IOP_MMHG: 12
IOP_METHOD: GOLDMANN APPLANATION

## 2025-06-20 ASSESSMENT — EXTERNAL EXAM - LEFT EYE: OS_EXAM: NORMAL

## 2025-06-20 ASSESSMENT — CUP TO DISC RATIO
OD_RATIO: .25
OS_RATIO: .25

## 2025-06-20 ASSESSMENT — EXTERNAL EXAM - RIGHT EYE: OD_EXAM: NORMAL

## 2025-06-20 NOTE — PROGRESS NOTES
Assessment/Plan   Problem List Items Addressed This Visit       Age-related nuclear cataract of both eyes - Primary    Very mild early cataract. Non significant cataract noted on exam. Discussed the natural course of cataract and may require surgery at some point in the future. Will plan to continue to monitor with serial exam.            Refraction error    More neutral hyperopic RX than previous. Suspect may have been dealing with some anisometropia and would explain poor tolerance. New Rx for glasses/SCL given per patient request. Patient's signature obtained to acknowledge and confirm that a paper copy of glasses/SCL Rx was given to patient in compliance with [facility] Eyeglass Rule. Electronic copy of Rx will also be available via [employer]/EPIC.             Other Visit Diagnoses         Routine general medical examination at a health care facility                Provided reassurance regarding above diagnoses and care received in the office visit today. Discussed outcomes and options along with the importance of treatment compliance. Understands the importance of any follow up visits. Patient instructed to call/communicate with our office if any new issues, questions, or concerns.     Will plan to see back in 2 year full or sooner PRN

## 2025-06-20 NOTE — ASSESSMENT & PLAN NOTE
Very mild early cataract. Non significant cataract noted on exam. Discussed the natural course of cataract and may require surgery at some point in the future. Will plan to continue to monitor with serial exam.

## 2025-06-20 NOTE — PATIENT INSTRUCTIONS
Thank you so much for choosing me to provide your care today!    If you were dilated your vision may remain blurry   or light sensitive for several hours.    The nature of eye and vision problems can require frequent follow up, please make every effort to adhere to any future appointments.    If you have any issues, questions, or concerns,   please do not hesitate to reach out.    If you receive a survey in regards to your care today, please mention any exceptional care my office staff and/or technicians provided.    You can reach our office at this number:    [phone]    Please consider signing up for and utilizing [employer]!  This is the best way to directly reach me or other  providers

## 2025-06-20 NOTE — ASSESSMENT & PLAN NOTE
More neutral hyperopic RX than previous. Suspect may have been dealing with some anisometropia and would explain poor tolerance. New Rx for glasses/SCL given per patient request. Patient's signature obtained to acknowledge and confirm that a paper copy of glasses/SCL Rx was given to patient in compliance with [facility] Eyeglass Rule. Electronic copy of Rx will also be available via [employer].

## 2025-06-23 ENCOUNTER — APPOINTMENT (OUTPATIENT)
Dept: PRIMARY CARE | Facility: CLINIC | Age: 45
End: 2025-06-23
Payer: COMMERCIAL

## 2025-06-25 ENCOUNTER — APPOINTMENT (OUTPATIENT)
Dept: SURGERY | Facility: CLINIC | Age: 45
End: 2025-06-25
Payer: COMMERCIAL

## 2025-07-07 ENCOUNTER — APPOINTMENT (OUTPATIENT)
Dept: ENDOCRINOLOGY | Facility: CLINIC | Age: 45
End: 2025-07-07
Payer: COMMERCIAL

## 2025-07-21 ENCOUNTER — TELEPHONE (OUTPATIENT)
Dept: SURGERY | Facility: CLINIC | Age: 45
End: 2025-07-21
Payer: COMMERCIAL

## 2025-07-21 ENCOUNTER — DOCUMENTATION (OUTPATIENT)
Dept: SURGERY | Facility: CLINIC | Age: 45
End: 2025-07-21
Payer: COMMERCIAL

## 2025-07-30 ENCOUNTER — TELEPHONE (OUTPATIENT)
Dept: SURGERY | Facility: CLINIC | Age: 45
End: 2025-07-30
Payer: COMMERCIAL

## 2025-07-30 NOTE — TELEPHONE ENCOUNTER
Discussed the 12 touchpoints that [employer] requires and all remaining clearances. Sent patient a password reset link to [employer] and a message in [employer] containing remaining clearances as well.

## 2025-08-12 ENCOUNTER — TELEPHONE (OUTPATIENT)
Dept: SURGERY | Facility: HOSPITAL | Age: 45
End: 2025-08-12
Payer: COMMERCIAL

## 2025-08-22 ENCOUNTER — TELEMEDICINE CLINICAL SUPPORT (OUTPATIENT)
Dept: GENETICS | Facility: HOSPITAL | Age: 45
End: 2025-08-22
Payer: COMMERCIAL

## 2025-08-22 DIAGNOSIS — Z80.3 FAMILY HISTORY OF BREAST CANCER: ICD-10-CM

## 2025-08-22 DIAGNOSIS — Z71.83 ENCOUNTER FOR NONPROCREATIVE GENETIC COUNSELING: Primary | ICD-10-CM

## 2025-08-22 DIAGNOSIS — Z80.0 FAMILY HISTORY OF GI TRACT CANCER: ICD-10-CM

## 2025-08-22 PROCEDURE — 96041 GENETIC COUNSELING SVC EA 30: CPT | Performed by: GENETIC COUNSELOR, MS

## 2025-09-17 ENCOUNTER — APPOINTMENT (OUTPATIENT)
Dept: NEUROLOGY | Facility: CLINIC | Age: 45
End: 2025-09-17
Payer: COMMERCIAL

## 2025-09-18 ENCOUNTER — APPOINTMENT (OUTPATIENT)
Dept: ENDOCRINOLOGY | Facility: CLINIC | Age: 45
End: 2025-09-18
Payer: COMMERCIAL

## 2025-11-26 ENCOUNTER — APPOINTMENT (OUTPATIENT)
Dept: PRIMARY CARE | Facility: CLINIC | Age: 45
End: 2025-11-26
Payer: COMMERCIAL